# Patient Record
Sex: MALE | URBAN - METROPOLITAN AREA
[De-identification: names, ages, dates, MRNs, and addresses within clinical notes are randomized per-mention and may not be internally consistent; named-entity substitution may affect disease eponyms.]

---

## 2024-04-25 ENCOUNTER — INPATIENT (INPATIENT)
Facility: HOSPITAL | Age: 19
LOS: 2 days | Discharge: HOME | DRG: 392 | End: 2024-04-28
Attending: STUDENT IN AN ORGANIZED HEALTH CARE EDUCATION/TRAINING PROGRAM | Admitting: STUDENT IN AN ORGANIZED HEALTH CARE EDUCATION/TRAINING PROGRAM
Payer: COMMERCIAL

## 2024-04-25 VITALS
RESPIRATION RATE: 20 BRPM | TEMPERATURE: 98 F | HEIGHT: 69 IN | HEART RATE: 120 BPM | SYSTOLIC BLOOD PRESSURE: 138 MMHG | WEIGHT: 220.9 LBS | OXYGEN SATURATION: 99 % | DIASTOLIC BLOOD PRESSURE: 74 MMHG

## 2024-04-25 DIAGNOSIS — R10.9 UNSPECIFIED ABDOMINAL PAIN: ICD-10-CM

## 2024-04-25 LAB
ALBUMIN SERPL ELPH-MCNC: 4.2 G/DL — SIGNIFICANT CHANGE UP (ref 3.3–5)
ALP SERPL-CCNC: 67 U/L — SIGNIFICANT CHANGE UP (ref 40–120)
ALT FLD-CCNC: 17 U/L — SIGNIFICANT CHANGE UP (ref 10–45)
ANION GAP SERPL CALC-SCNC: 14 MMOL/L — SIGNIFICANT CHANGE UP (ref 5–17)
APPEARANCE UR: CLEAR — SIGNIFICANT CHANGE UP
AST SERPL-CCNC: 14 U/L — SIGNIFICANT CHANGE UP (ref 10–40)
BACTERIA # UR AUTO: NEGATIVE /HPF — SIGNIFICANT CHANGE UP
BASE EXCESS BLDV CALC-SCNC: 1 MMOL/L — SIGNIFICANT CHANGE UP (ref -2–3)
BASOPHILS # BLD AUTO: 0.06 K/UL — SIGNIFICANT CHANGE UP (ref 0–0.2)
BASOPHILS NFR BLD AUTO: 0.5 % — SIGNIFICANT CHANGE UP (ref 0–2)
BILIRUB SERPL-MCNC: 1 MG/DL — SIGNIFICANT CHANGE UP (ref 0.2–1.2)
BILIRUB UR-MCNC: NEGATIVE — SIGNIFICANT CHANGE UP
BUN SERPL-MCNC: 13 MG/DL — SIGNIFICANT CHANGE UP (ref 7–23)
CA-I SERPL-SCNC: 1.23 MMOL/L — SIGNIFICANT CHANGE UP (ref 1.15–1.33)
CALCIUM SERPL-MCNC: 9.9 MG/DL — SIGNIFICANT CHANGE UP (ref 8.4–10.5)
CAST: 0 /LPF — SIGNIFICANT CHANGE UP (ref 0–4)
CHLORIDE BLDV-SCNC: 101 MMOL/L — SIGNIFICANT CHANGE UP (ref 96–108)
CHLORIDE SERPL-SCNC: 100 MMOL/L — SIGNIFICANT CHANGE UP (ref 96–108)
CO2 BLDV-SCNC: 28 MMOL/L — HIGH (ref 22–26)
CO2 SERPL-SCNC: 23 MMOL/L — SIGNIFICANT CHANGE UP (ref 22–31)
COLOR SPEC: YELLOW — SIGNIFICANT CHANGE UP
CREAT SERPL-MCNC: 0.92 MG/DL — SIGNIFICANT CHANGE UP (ref 0.5–1.3)
DIFF PNL FLD: ABNORMAL
EGFR: 121 ML/MIN/1.73M2 — SIGNIFICANT CHANGE UP
EOSINOPHIL # BLD AUTO: 0.02 K/UL — SIGNIFICANT CHANGE UP (ref 0–0.5)
EOSINOPHIL NFR BLD AUTO: 0.2 % — SIGNIFICANT CHANGE UP (ref 0–6)
GAS PNL BLDV: 134 MMOL/L — LOW (ref 136–145)
GAS PNL BLDV: SIGNIFICANT CHANGE UP
GLUCOSE BLDV-MCNC: 90 MG/DL — SIGNIFICANT CHANGE UP (ref 70–99)
GLUCOSE SERPL-MCNC: 90 MG/DL — SIGNIFICANT CHANGE UP (ref 70–99)
GLUCOSE UR QL: NEGATIVE MG/DL — SIGNIFICANT CHANGE UP
HCO3 BLDV-SCNC: 27 MMOL/L — SIGNIFICANT CHANGE UP (ref 22–29)
HCT VFR BLD CALC: 40.8 % — SIGNIFICANT CHANGE UP (ref 39–50)
HCT VFR BLDA CALC: 41 % — SIGNIFICANT CHANGE UP (ref 39–51)
HGB BLD CALC-MCNC: 13.8 G/DL — SIGNIFICANT CHANGE UP (ref 12.6–17.4)
HGB BLD-MCNC: 13.4 G/DL — SIGNIFICANT CHANGE UP (ref 13–17)
IMM GRANULOCYTES NFR BLD AUTO: 0.5 % — SIGNIFICANT CHANGE UP (ref 0–0.9)
KETONES UR-MCNC: NEGATIVE MG/DL — SIGNIFICANT CHANGE UP
LACTATE BLDV-MCNC: 1 MMOL/L — SIGNIFICANT CHANGE UP (ref 0.5–2)
LEUKOCYTE ESTERASE UR-ACNC: NEGATIVE — SIGNIFICANT CHANGE UP
LIDOCAIN IGE QN: 23 U/L — SIGNIFICANT CHANGE UP (ref 7–60)
LYMPHOCYTES # BLD AUTO: 1.96 K/UL — SIGNIFICANT CHANGE UP (ref 1–3.3)
LYMPHOCYTES # BLD AUTO: 15 % — SIGNIFICANT CHANGE UP (ref 13–44)
MCHC RBC-ENTMCNC: 27 PG — SIGNIFICANT CHANGE UP (ref 27–34)
MCHC RBC-ENTMCNC: 32.8 GM/DL — SIGNIFICANT CHANGE UP (ref 32–36)
MCV RBC AUTO: 82.3 FL — SIGNIFICANT CHANGE UP (ref 80–100)
MONOCYTES # BLD AUTO: 0.92 K/UL — HIGH (ref 0–0.9)
MONOCYTES NFR BLD AUTO: 7 % — SIGNIFICANT CHANGE UP (ref 2–14)
NEUTROPHILS # BLD AUTO: 10.07 K/UL — HIGH (ref 1.8–7.4)
NEUTROPHILS NFR BLD AUTO: 76.8 % — SIGNIFICANT CHANGE UP (ref 43–77)
NITRITE UR-MCNC: NEGATIVE — SIGNIFICANT CHANGE UP
NRBC # BLD: 0 /100 WBCS — SIGNIFICANT CHANGE UP (ref 0–0)
PCO2 BLDV: 45 MMHG — SIGNIFICANT CHANGE UP (ref 42–55)
PH BLDV: 7.38 — SIGNIFICANT CHANGE UP (ref 7.32–7.43)
PH UR: 6 — SIGNIFICANT CHANGE UP (ref 5–8)
PLATELET # BLD AUTO: 314 K/UL — SIGNIFICANT CHANGE UP (ref 150–400)
PO2 BLDV: 19 MMHG — LOW (ref 25–45)
POTASSIUM BLDV-SCNC: 4.3 MMOL/L — SIGNIFICANT CHANGE UP (ref 3.5–5.1)
POTASSIUM SERPL-MCNC: 4.1 MMOL/L — SIGNIFICANT CHANGE UP (ref 3.5–5.3)
POTASSIUM SERPL-SCNC: 4.1 MMOL/L — SIGNIFICANT CHANGE UP (ref 3.5–5.3)
PROT SERPL-MCNC: 8.8 G/DL — HIGH (ref 6–8.3)
PROT UR-MCNC: NEGATIVE MG/DL — SIGNIFICANT CHANGE UP
RBC # BLD: 4.96 M/UL — SIGNIFICANT CHANGE UP (ref 4.2–5.8)
RBC # FLD: 12.5 % — SIGNIFICANT CHANGE UP (ref 10.3–14.5)
RBC CASTS # UR COMP ASSIST: 3 /HPF — SIGNIFICANT CHANGE UP (ref 0–4)
SAO2 % BLDV: 22.8 % — LOW (ref 67–88)
SODIUM SERPL-SCNC: 137 MMOL/L — SIGNIFICANT CHANGE UP (ref 135–145)
SP GR SPEC: 1.02 — SIGNIFICANT CHANGE UP (ref 1–1.03)
SQUAMOUS # UR AUTO: 0 /HPF — SIGNIFICANT CHANGE UP (ref 0–5)
UROBILINOGEN FLD QL: 4 MG/DL (ref 0.2–1)
WBC # BLD: 13.1 K/UL — HIGH (ref 3.8–10.5)
WBC # FLD AUTO: 13.1 K/UL — HIGH (ref 3.8–10.5)
WBC UR QL: 1 /HPF — SIGNIFICANT CHANGE UP (ref 0–5)

## 2024-04-25 PROCEDURE — 99285 EMERGENCY DEPT VISIT HI MDM: CPT

## 2024-04-25 PROCEDURE — 99222 1ST HOSP IP/OBS MODERATE 55: CPT | Mod: GC

## 2024-04-25 RX ORDER — SODIUM CHLORIDE 9 MG/ML
1000 INJECTION INTRAMUSCULAR; INTRAVENOUS; SUBCUTANEOUS ONCE
Refills: 0 | Status: COMPLETED | OUTPATIENT
Start: 2024-04-25 | End: 2024-04-25

## 2024-04-25 RX ORDER — CIPROFLOXACIN LACTATE 400MG/40ML
VIAL (ML) INTRAVENOUS
Refills: 0 | Status: DISCONTINUED | OUTPATIENT
Start: 2024-04-25 | End: 2024-04-27

## 2024-04-25 RX ORDER — ACETAMINOPHEN 500 MG
1000 TABLET ORAL ONCE
Refills: 0 | Status: COMPLETED | OUTPATIENT
Start: 2024-04-25 | End: 2024-04-25

## 2024-04-25 RX ORDER — CIPROFLOXACIN LACTATE 400MG/40ML
400 VIAL (ML) INTRAVENOUS ONCE
Refills: 0 | Status: COMPLETED | OUTPATIENT
Start: 2024-04-25 | End: 2024-04-25

## 2024-04-25 RX ORDER — CIPROFLOXACIN LACTATE 400MG/40ML
400 VIAL (ML) INTRAVENOUS EVERY 12 HOURS
Refills: 0 | Status: DISCONTINUED | OUTPATIENT
Start: 2024-04-26 | End: 2024-04-27

## 2024-04-25 RX ORDER — DEXTROSE MONOHYDRATE, SODIUM CHLORIDE, AND POTASSIUM CHLORIDE 50; .745; 4.5 G/1000ML; G/1000ML; G/1000ML
1000 INJECTION, SOLUTION INTRAVENOUS
Refills: 0 | Status: DISCONTINUED | OUTPATIENT
Start: 2024-04-25 | End: 2024-04-27

## 2024-04-25 RX ORDER — METRONIDAZOLE 500 MG
500 TABLET ORAL EVERY 8 HOURS
Refills: 0 | Status: DISCONTINUED | OUTPATIENT
Start: 2024-04-25 | End: 2024-04-27

## 2024-04-25 RX ADMIN — Medication 1000 MILLIGRAM(S): at 14:38

## 2024-04-25 RX ADMIN — SODIUM CHLORIDE 1000 MILLILITER(S): 9 INJECTION INTRAMUSCULAR; INTRAVENOUS; SUBCUTANEOUS at 14:38

## 2024-04-25 RX ADMIN — Medication 200 MILLIGRAM(S): at 15:53

## 2024-04-25 RX ADMIN — SODIUM CHLORIDE 1000 MILLILITER(S): 9 INJECTION INTRAMUSCULAR; INTRAVENOUS; SUBCUTANEOUS at 13:45

## 2024-04-25 RX ADMIN — DEXTROSE MONOHYDRATE, SODIUM CHLORIDE, AND POTASSIUM CHLORIDE 140 MILLILITER(S): 50; .745; 4.5 INJECTION, SOLUTION INTRAVENOUS at 17:19

## 2024-04-25 RX ADMIN — Medication 100 MILLIGRAM(S): at 21:20

## 2024-04-25 RX ADMIN — Medication 400 MILLIGRAM(S): at 13:48

## 2024-04-25 NOTE — ED CLERICAL - NS ED CLERK NOTE PRE-ARRIVAL INFORMATION; ADDITIONAL PRE-ARRIVAL INFORMATION
CC/Reason For referral: 2 weeks of symptoms. CT scan done today showing sigmoid diverticulitis with 5cm multiloculated abscess with fistula. Dr. Lombardi made aware.   Preferred Consultant(if applicable):  Who admits for you (if needed):  Do you have documents you would like to fax over?  Would you still like to speak to an ED attending? Yes

## 2024-04-25 NOTE — ED PROVIDER NOTE - OBJECTIVE STATEMENT
19-year-old male presented to emergency department due to abdominal pain for the past week.  Patient states the pain is in the pelvic area and has been having clear mucoid stools with it.  He states pain is worse when he has to go to the bathroom, with urinating and defecating.  Per the patient's primary care, he was given antibiotics for concern for UTI or STD, but this did not help.  Patient states he is also been feeling febrile as well but denies any cough, congestion, shortness of breath, chest pain.

## 2024-04-25 NOTE — H&P ADULT - HISTORY OF PRESENT ILLNESS
SURGERY CONSULT NOTE    Patient is a 19y old  Male who presents with a chief complaint of abdominal pain    HPI:  19M with no significant PMH developed low abdominal pain two weeks ago and thought to have UTI and was given 1 week antibiotics, with low abdominal pain persisting prompting outpatient CT which was concerning for diverticular abscess.  He reports he has had clear mucoid stools; pain worse with BM. Endorses subjective fever/chills. Works as EMS. Stable in the ED.      PAST MEDICAL & SURGICAL HISTORY:    [  ] No significant past history as reviewed with the patient and family    FAMILY HISTORY:    [  ] Family history not pertinent as reviewed with the patient and family    SOCIAL HISTORY:    MEDICATIONS  (STANDING):  ciprofloxacin   IVPB      dextrose 5% + sodium chloride 0.45% with potassium chloride 20 mEq/L 1000 milliLiter(s) (140 mL/Hr) IV Continuous <Continuous>  metroNIDAZOLE  IVPB 500 milliGRAM(s) IV Intermittent every 8 hours    MEDICATIONS  (PRN):    Allergies    No Known Allergies    Intolerances    PHYSICAL EXAM  General: No acute distress, resting comfortably in bed.  HEENT: Normocephalic atraumatic  Respiratory: Nonlabored respirations  Cardio: regular rate  Abdomen: soft, nondistended, nontender  Extremities: warm and well perfused      Vital Signs Last 24 Hrs  T(C): 38.1 (25 Apr 2024 13:31), Max: 38.1 (25 Apr 2024 13:31)  T(F): 100.5 (25 Apr 2024 13:31), Max: 100.5 (25 Apr 2024 13:31)  HR: 117 (25 Apr 2024 13:31) (117 - 120)  BP: 133/69 (25 Apr 2024 13:31) (133/69 - 138/74)  BP(mean): 88 (25 Apr 2024 13:31) (88 - 88)  RR: 18 (25 Apr 2024 13:31) (18 - 20)  SpO2: 100% (25 Apr 2024 13:31) (99% - 100%)    Parameters below as of 25 Apr 2024 13:31  Patient On (Oxygen Delivery Method): room air      Daily Height in cm: 175.26 (25 Apr 2024 13:01)    Daily                             13.4   13.10 )-----------( 314      ( 25 Apr 2024 13:41 )             40.8     04-25    137  |  100  |  13  ----------------------------<  90  4.1   |  23  |  0.92    Ca    9.9      25 Apr 2024 13:41    TPro  8.8<H>  /  Alb  4.2  /  TBili  1.0  /  DBili  x   /  AST  14  /  ALT  17  /  AlkPhos  67  04-25      Urinalysis Basic - ( 25 Apr 2024 13:41 )    Color: x / Appearance: x / SG: x / pH: x  Gluc: 90 mg/dL / Ketone: x  / Bili: x / Urobili: x   Blood: x / Protein: x / Nitrite: x   Leuk Esterase: x / RBC: x / WBC x   Sq Epi: x / Non Sq Epi: x / Bacteria: x

## 2024-04-25 NOTE — H&P ADULT - ATTENDING COMMENTS
Perforated diverticulitis with pelvic/peritoneal abscess  -IV abx  -NPO to clears  -dvt ppx  -IR evaluation  -serial abdominal exams

## 2024-04-25 NOTE — H&P ADULT - ASSESSMENT
19M w/ sigmoid diverticulitis complicated by 4 x 4.9 x3.3 cm multiloculated abscess.    Plan  - Admit to red team surgery  - no acute surgical intervention  - IR Consult for drainage  - NPO/IVF  - Cipro/flagyl  - Pain control  - Monitor bms  - Mechanical DVTp. Holding chemical pending IR eval    Patient discussed with Colorectal Surgery Fellow on behalf of Dr. Wilson  Surgery Team Red   [id number]

## 2024-04-25 NOTE — ED ADULT NURSE REASSESSMENT NOTE - COMFORT CARE
plan of care explained/side rails down/wait time explained
plan of care explained/side rails down/warm blanket provided

## 2024-04-25 NOTE — ED PROVIDER NOTE - ATTENDING CONTRIBUTION TO CARE
I, Bayron Lundy, have performed a history and physical exam on this patient, and discussed their management with the resident. I have fully participated in the care of this patient. I agree with the history, physical exam, and plan as documented by the resident

## 2024-04-25 NOTE — CONSULT NOTE ADULT - SUBJECTIVE AND OBJECTIVE BOX
Interventional Radiology    HPI: 18 y/o presenting w/ abdominal pain 2/2 perforated sigmoid diverticulitis. IR consulted for abscess drainage.    Allergies: No Known Allergies    Medications (Abx/Cardiac/Anticoagulation/Blood Products)  ciprofloxacin   IVPB: 200 mL/Hr IV Intermittent (04-25 @ 15:53)    Data:  175.3  100.2  T(C): 36.6  HR: 88  BP: 121/67  RR: 16  SpO2: 96%    -WBC 13.10 / HgB 13.4 / Hct 40.8 / Plt 314  -Na 137 / Cl 100 / BUN 13 / Glucose 90  -K 4.1 / CO2 23 / Cr 0.92  -ALT 17 / Alk Phos 67 / T.Bili 1.0      Radiology:   Outpatient CT A/P reviewed    Assessment/Plan:   18 y/o presenting w/ abdominal pain 2/2 perforated sigmoid diverticulitis. IR consulted for abscess drainage.    -- Imaging reviewed; collection is small and intraluminal in nature; not ammenable to drainage  -- Recommend conservative management with abx at this time.  -- IR will sign off at this time, please reconsult if this patients clinical status changes.     --  Tod Domingo, DO  PGY-2 Vascular and Interventional Radiology Resident   Available on [employer] Teams    - Non-emergent consults: Place IR consult order in [place]  - Emergent issues (pager): [facility] [phone]; [facility] [phone]; [phone]  - Scheduling questions: [facility] [phone]; [facility] [phone]  - Clinic/outpatient booking: [facility] [phone]; [facility] [phone] Interventional Radiology    HPI: 20 y/o presenting w/ abdominal pain 2/2 perforated sigmoid diverticulitis. IR consulted for abscess drainage.    Allergies: No Known Allergies    Medications (Abx/Cardiac/Anticoagulation/Blood Products)  ciprofloxacin   IVPB: 200 mL/Hr IV Intermittent (04-25 @ 15:53)    Data:  175.3  100.2  T(C): 36.6  HR: 88  BP: 121/67  RR: 16  SpO2: 96%    -WBC 13.10 / HgB 13.4 / Hct 40.8 / Plt 314  -Na 137 / Cl 100 / BUN 13 / Glucose 90  -K 4.1 / CO2 23 / Cr 0.92  -ALT 17 / Alk Phos 67 / T.Bili 1.0      Radiology:   Outpatient CT A/P reviewed    Assessment/Plan:   20 y/o presenting w/ abdominal pain 2/2 perforated sigmoid diverticulitis. IR consulted for abscess drainage.    -- Imaging reviewed; collection is small and intraluminal in nature; not amenable to drainage.  -- Recommend conservative management with abx at this time.  -- IR will sign off at this time, please reconsult if this patients clinical status changes.     --  Tod Domingo, DO  PGY-2 Vascular and Interventional Radiology Resident   Available on [employer] Teams    - Non-emergent consults: Place IR consult order in [place]  - Emergent issues (pager): [facility] [phone]; [facility] [phone]; [phone]  - Scheduling questions: [facility] [phone]; [facility] [phone]  - Clinic/outpatient booking: [facility] [phone]; [facility] [phone]

## 2024-04-25 NOTE — ED PROVIDER NOTE - CLINICAL SUMMARY MEDICAL DECISION MAKING FREE TEXT BOX
19-year-old male presented to emergency department due to abdominal pain for the past week.  Patient states the pain is in the pelvic area and has been having clear mucoid stools with it.  Patient was given treatments for STDs and UTIs by primary care doctor with no relief.  Patient also been having fevers in the emergency department.  On exam patient has suprapubic tenderness to palpation, no tenderness of the regions of the abdomen.  No swelling or tenderness of the testicles bilaterally.  CT scan performed outpatient showed diverticulitis.  Will order basic labs for abdominal pain.  Surgery will review CT scan and let us know what that this will require a surgical intervention.

## 2024-04-25 NOTE — H&P ADULT - NSHPLABSRESULTS_GEN_ALL_CORE
OUTPATIENT IMAGING UPLOADING  - Report says sigmoid diverticulitis complicated by 4 x 4.9 x3.3 cm multiloculated abscess. A portion of abscess abuts the rectum with indistinct wall concerning for fisutla.

## 2024-04-25 NOTE — ED ADULT NURSE REASSESSMENT NOTE - NS ED NURSE REASSESS COMMENT FT1
Pt listening to airpods, well appearing. Mother at bedside. Pt aware NPO and admitted waiting for bed.

## 2024-04-25 NOTE — ED ADULT NURSE NOTE - OBJECTIVE STATEMENT
Pt 19 year old male, A/O x4. Pt came in due to suprapubic pain and rectal pain. No PMH. As per pt, received recent CT that showed diverticulitis. Pt states he has been having on and of fevers. Also mentioned, urine was tested and blood was noted. Upon assessment, pt febrile 100.5F orally. LLQ tender to touch, soft, nondistended. Skin- hot, dry, intact. Denies chest pain, sob, chills, n/v/d, dysuria, ha, numbness/ tingling.

## 2024-04-26 LAB
ANION GAP SERPL CALC-SCNC: 12 MMOL/L — SIGNIFICANT CHANGE UP (ref 5–17)
BUN SERPL-MCNC: 9 MG/DL — SIGNIFICANT CHANGE UP (ref 7–23)
CALCIUM SERPL-MCNC: 9.2 MG/DL — SIGNIFICANT CHANGE UP (ref 8.4–10.5)
CHLORIDE SERPL-SCNC: 105 MMOL/L — SIGNIFICANT CHANGE UP (ref 96–108)
CO2 SERPL-SCNC: 21 MMOL/L — LOW (ref 22–31)
CREAT SERPL-MCNC: 0.77 MG/DL — SIGNIFICANT CHANGE UP (ref 0.5–1.3)
CULTURE RESULTS: NO GROWTH — SIGNIFICANT CHANGE UP
EGFR: 131 ML/MIN/1.73M2 — SIGNIFICANT CHANGE UP
GLUCOSE SERPL-MCNC: 101 MG/DL — HIGH (ref 70–99)
HCT VFR BLD CALC: 37.5 % — LOW (ref 39–50)
HGB BLD-MCNC: 11.9 G/DL — LOW (ref 13–17)
MAGNESIUM SERPL-MCNC: 2.1 MG/DL — SIGNIFICANT CHANGE UP (ref 1.6–2.6)
MCHC RBC-ENTMCNC: 26.7 PG — LOW (ref 27–34)
MCHC RBC-ENTMCNC: 31.7 GM/DL — LOW (ref 32–36)
MCV RBC AUTO: 84.1 FL — SIGNIFICANT CHANGE UP (ref 80–100)
NRBC # BLD: 0 /100 WBCS — SIGNIFICANT CHANGE UP (ref 0–0)
PHOSPHATE SERPL-MCNC: 3.5 MG/DL — SIGNIFICANT CHANGE UP (ref 2.5–4.5)
PLATELET # BLD AUTO: 267 K/UL — SIGNIFICANT CHANGE UP (ref 150–400)
POTASSIUM SERPL-MCNC: 4.1 MMOL/L — SIGNIFICANT CHANGE UP (ref 3.5–5.3)
POTASSIUM SERPL-SCNC: 4.1 MMOL/L — SIGNIFICANT CHANGE UP (ref 3.5–5.3)
RBC # BLD: 4.46 M/UL — SIGNIFICANT CHANGE UP (ref 4.2–5.8)
RBC # FLD: 12.5 % — SIGNIFICANT CHANGE UP (ref 10.3–14.5)
SODIUM SERPL-SCNC: 138 MMOL/L — SIGNIFICANT CHANGE UP (ref 135–145)
SPECIMEN SOURCE: SIGNIFICANT CHANGE UP
WBC # BLD: 9 K/UL — SIGNIFICANT CHANGE UP (ref 3.8–10.5)
WBC # FLD AUTO: 9 K/UL — SIGNIFICANT CHANGE UP (ref 3.8–10.5)

## 2024-04-26 PROCEDURE — 99222 1ST HOSP IP/OBS MODERATE 55: CPT

## 2024-04-26 RX ORDER — HEPARIN SODIUM 5000 [USP'U]/ML
5000 INJECTION INTRAVENOUS; SUBCUTANEOUS EVERY 8 HOURS
Refills: 0 | Status: DISCONTINUED | OUTPATIENT
Start: 2024-04-26 | End: 2024-04-28

## 2024-04-26 RX ADMIN — Medication 200 MILLIGRAM(S): at 05:25

## 2024-04-26 RX ADMIN — Medication 100 MILLIGRAM(S): at 14:11

## 2024-04-26 RX ADMIN — Medication 200 MILLIGRAM(S): at 18:33

## 2024-04-26 RX ADMIN — Medication 100 MILLIGRAM(S): at 21:04

## 2024-04-26 RX ADMIN — HEPARIN SODIUM 5000 UNIT(S): 5000 INJECTION INTRAVENOUS; SUBCUTANEOUS at 14:13

## 2024-04-26 RX ADMIN — Medication 100 MILLIGRAM(S): at 05:25

## 2024-04-26 RX ADMIN — HEPARIN SODIUM 5000 UNIT(S): 5000 INJECTION INTRAVENOUS; SUBCUTANEOUS at 21:04

## 2024-04-26 NOTE — PROGRESS NOTE ADULT - ATTENDING COMMENTS
Peforated diverticulitis  -IV abx   -dvt ppx  -clears to LRD  -ct scan as outpt to confirm resolution of abscess before DC of abx  -OOB Peforated diverticulitis  -IV abx   -dvt ppx  -clears to LRD  -ct scan as outpt to confirm resolution of abscess before DC of abx  -OOB  -Colonoscopy as outpt.  Will need to r/o IBD vs diverticulitis for colonic perforation.  Imaging reviewed.  May also be secondary to foreign body

## 2024-04-27 LAB
ANION GAP SERPL CALC-SCNC: 13 MMOL/L — SIGNIFICANT CHANGE UP (ref 5–17)
BUN SERPL-MCNC: 5 MG/DL — LOW (ref 7–23)
CALCIUM SERPL-MCNC: 9.6 MG/DL — SIGNIFICANT CHANGE UP (ref 8.4–10.5)
CHLORIDE SERPL-SCNC: 105 MMOL/L — SIGNIFICANT CHANGE UP (ref 96–108)
CO2 SERPL-SCNC: 22 MMOL/L — SIGNIFICANT CHANGE UP (ref 22–31)
CREAT SERPL-MCNC: 0.7 MG/DL — SIGNIFICANT CHANGE UP (ref 0.5–1.3)
EGFR: 134 ML/MIN/1.73M2 — SIGNIFICANT CHANGE UP
GLUCOSE SERPL-MCNC: 99 MG/DL — SIGNIFICANT CHANGE UP (ref 70–99)
HCT VFR BLD CALC: 38.2 % — LOW (ref 39–50)
HGB BLD-MCNC: 12.3 G/DL — LOW (ref 13–17)
MAGNESIUM SERPL-MCNC: 2.5 MG/DL — SIGNIFICANT CHANGE UP (ref 1.6–2.6)
MCHC RBC-ENTMCNC: 27 PG — SIGNIFICANT CHANGE UP (ref 27–34)
MCHC RBC-ENTMCNC: 32.2 GM/DL — SIGNIFICANT CHANGE UP (ref 32–36)
MCV RBC AUTO: 83.8 FL — SIGNIFICANT CHANGE UP (ref 80–100)
NRBC # BLD: 0 /100 WBCS — SIGNIFICANT CHANGE UP (ref 0–0)
PHOSPHATE SERPL-MCNC: 3.8 MG/DL — SIGNIFICANT CHANGE UP (ref 2.5–4.5)
PLATELET # BLD AUTO: 268 K/UL — SIGNIFICANT CHANGE UP (ref 150–400)
POTASSIUM SERPL-MCNC: 4.1 MMOL/L — SIGNIFICANT CHANGE UP (ref 3.5–5.3)
POTASSIUM SERPL-SCNC: 4.1 MMOL/L — SIGNIFICANT CHANGE UP (ref 3.5–5.3)
RBC # BLD: 4.56 M/UL — SIGNIFICANT CHANGE UP (ref 4.2–5.8)
RBC # FLD: 12.4 % — SIGNIFICANT CHANGE UP (ref 10.3–14.5)
SODIUM SERPL-SCNC: 140 MMOL/L — SIGNIFICANT CHANGE UP (ref 135–145)
WBC # BLD: 4.71 K/UL — SIGNIFICANT CHANGE UP (ref 3.8–10.5)
WBC # FLD AUTO: 4.71 K/UL — SIGNIFICANT CHANGE UP (ref 3.8–10.5)

## 2024-04-27 PROCEDURE — 99231 SBSQ HOSP IP/OBS SF/LOW 25: CPT

## 2024-04-27 RX ORDER — METRONIDAZOLE 500 MG
500 TABLET ORAL EVERY 8 HOURS
Refills: 0 | Status: DISCONTINUED | OUTPATIENT
Start: 2024-04-27 | End: 2024-04-28

## 2024-04-27 RX ORDER — CIPROFLOXACIN LACTATE 400MG/40ML
500 VIAL (ML) INTRAVENOUS EVERY 12 HOURS
Refills: 0 | Status: DISCONTINUED | OUTPATIENT
Start: 2024-04-27 | End: 2024-04-28

## 2024-04-27 RX ADMIN — Medication 500 MILLIGRAM(S): at 21:02

## 2024-04-27 RX ADMIN — Medication 100 MILLIGRAM(S): at 13:02

## 2024-04-27 RX ADMIN — Medication 100 MILLIGRAM(S): at 05:26

## 2024-04-27 RX ADMIN — Medication 200 MILLIGRAM(S): at 17:15

## 2024-04-27 RX ADMIN — HEPARIN SODIUM 5000 UNIT(S): 5000 INJECTION INTRAVENOUS; SUBCUTANEOUS at 05:26

## 2024-04-27 RX ADMIN — Medication 200 MILLIGRAM(S): at 05:27

## 2024-04-27 RX ADMIN — HEPARIN SODIUM 5000 UNIT(S): 5000 INJECTION INTRAVENOUS; SUBCUTANEOUS at 13:02

## 2024-04-27 RX ADMIN — HEPARIN SODIUM 5000 UNIT(S): 5000 INJECTION INTRAVENOUS; SUBCUTANEOUS at 21:02

## 2024-04-27 NOTE — PROGRESS NOTE ADULT - ASSESSMENT
19M w/ sigmoid diverticulitis complicated by 4 x 4.9 x3.3 cm multiloculated abscess.    Plan    - no acute surgical intervention  - f/u IR Consult for drainage  - NPO/IVF  - Cipro/flagyl  - Pain control    Surgery Team Red   [id number]  
19M w/ sigmoid diverticulitis complicated by 4 x 4.9 x3.3 cm multiloculated abscess.    Plan  - no acute surgical intervention  - LRD/IVL  - Cipro/flagyl eventual transition to PO prior to dc  - Pain control    Surgery Team Red   [id number]

## 2024-04-28 ENCOUNTER — TRANSCRIPTION ENCOUNTER (OUTPATIENT)
Age: 19
End: 2024-04-28

## 2024-04-28 ENCOUNTER — NON-APPOINTMENT (OUTPATIENT)
Age: 19
End: 2024-04-28

## 2024-04-28 VITALS
DIASTOLIC BLOOD PRESSURE: 76 MMHG | OXYGEN SATURATION: 96 % | RESPIRATION RATE: 18 BRPM | SYSTOLIC BLOOD PRESSURE: 105 MMHG | TEMPERATURE: 98 F | HEART RATE: 78 BPM

## 2024-04-28 LAB
ANION GAP SERPL CALC-SCNC: 16 MMOL/L — SIGNIFICANT CHANGE UP (ref 5–17)
BUN SERPL-MCNC: 8 MG/DL — SIGNIFICANT CHANGE UP (ref 7–23)
CALCIUM SERPL-MCNC: 10.2 MG/DL — SIGNIFICANT CHANGE UP (ref 8.4–10.5)
CHLORIDE SERPL-SCNC: 103 MMOL/L — SIGNIFICANT CHANGE UP (ref 96–108)
CO2 SERPL-SCNC: 23 MMOL/L — SIGNIFICANT CHANGE UP (ref 22–31)
CREAT SERPL-MCNC: 0.78 MG/DL — SIGNIFICANT CHANGE UP (ref 0.5–1.3)
EGFR: 130 ML/MIN/1.73M2 — SIGNIFICANT CHANGE UP
GLUCOSE SERPL-MCNC: 81 MG/DL — SIGNIFICANT CHANGE UP (ref 70–99)
HCT VFR BLD CALC: 42.4 % — SIGNIFICANT CHANGE UP (ref 39–50)
HGB BLD-MCNC: 13.7 G/DL — SIGNIFICANT CHANGE UP (ref 13–17)
MAGNESIUM SERPL-MCNC: 2.3 MG/DL — SIGNIFICANT CHANGE UP (ref 1.6–2.6)
MCHC RBC-ENTMCNC: 26.8 PG — LOW (ref 27–34)
MCHC RBC-ENTMCNC: 32.3 GM/DL — SIGNIFICANT CHANGE UP (ref 32–36)
MCV RBC AUTO: 83 FL — SIGNIFICANT CHANGE UP (ref 80–100)
NRBC # BLD: 0 /100 WBCS — SIGNIFICANT CHANGE UP (ref 0–0)
PHOSPHATE SERPL-MCNC: 4.3 MG/DL — SIGNIFICANT CHANGE UP (ref 2.5–4.5)
PLATELET # BLD AUTO: 332 K/UL — SIGNIFICANT CHANGE UP (ref 150–400)
POTASSIUM SERPL-MCNC: 4 MMOL/L — SIGNIFICANT CHANGE UP (ref 3.5–5.3)
POTASSIUM SERPL-SCNC: 4 MMOL/L — SIGNIFICANT CHANGE UP (ref 3.5–5.3)
RBC # BLD: 5.11 M/UL — SIGNIFICANT CHANGE UP (ref 4.2–5.8)
RBC # FLD: 12.3 % — SIGNIFICANT CHANGE UP (ref 10.3–14.5)
SODIUM SERPL-SCNC: 142 MMOL/L — SIGNIFICANT CHANGE UP (ref 135–145)
WBC # BLD: 5.27 K/UL — SIGNIFICANT CHANGE UP (ref 3.8–10.5)
WBC # FLD AUTO: 5.27 K/UL — SIGNIFICANT CHANGE UP (ref 3.8–10.5)

## 2024-04-28 PROCEDURE — 85027 COMPLETE CBC AUTOMATED: CPT

## 2024-04-28 PROCEDURE — 83735 ASSAY OF MAGNESIUM: CPT

## 2024-04-28 PROCEDURE — 82803 BLOOD GASES ANY COMBINATION: CPT

## 2024-04-28 PROCEDURE — 85025 COMPLETE CBC W/AUTO DIFF WBC: CPT

## 2024-04-28 PROCEDURE — 82435 ASSAY OF BLOOD CHLORIDE: CPT

## 2024-04-28 PROCEDURE — 81001 URINALYSIS AUTO W/SCOPE: CPT

## 2024-04-28 PROCEDURE — 99231 SBSQ HOSP IP/OBS SF/LOW 25: CPT

## 2024-04-28 PROCEDURE — 36415 COLL VENOUS BLD VENIPUNCTURE: CPT

## 2024-04-28 PROCEDURE — 83605 ASSAY OF LACTIC ACID: CPT

## 2024-04-28 PROCEDURE — 82330 ASSAY OF CALCIUM: CPT

## 2024-04-28 PROCEDURE — 87040 BLOOD CULTURE FOR BACTERIA: CPT

## 2024-04-28 PROCEDURE — 85018 HEMOGLOBIN: CPT

## 2024-04-28 PROCEDURE — 96365 THER/PROPH/DIAG IV INF INIT: CPT

## 2024-04-28 PROCEDURE — 80048 BASIC METABOLIC PNL TOTAL CA: CPT

## 2024-04-28 PROCEDURE — 83690 ASSAY OF LIPASE: CPT

## 2024-04-28 PROCEDURE — 82947 ASSAY GLUCOSE BLOOD QUANT: CPT

## 2024-04-28 PROCEDURE — 84132 ASSAY OF SERUM POTASSIUM: CPT

## 2024-04-28 PROCEDURE — 80053 COMPREHEN METABOLIC PANEL: CPT

## 2024-04-28 PROCEDURE — 87086 URINE CULTURE/COLONY COUNT: CPT

## 2024-04-28 PROCEDURE — 84295 ASSAY OF SERUM SODIUM: CPT

## 2024-04-28 PROCEDURE — 85014 HEMATOCRIT: CPT

## 2024-04-28 PROCEDURE — 99285 EMERGENCY DEPT VISIT HI MDM: CPT | Mod: 25

## 2024-04-28 PROCEDURE — 84100 ASSAY OF PHOSPHORUS: CPT

## 2024-04-28 RX ADMIN — Medication 500 MILLIGRAM(S): at 05:07

## 2024-04-28 RX ADMIN — HEPARIN SODIUM 5000 UNIT(S): 5000 INJECTION INTRAVENOUS; SUBCUTANEOUS at 05:07

## 2024-04-28 NOTE — DISCHARGE NOTE PROVIDER - CARE PROVIDER_API CALL
Bayron Wilson  Surgery  [address] [place] [address], [address]  [place] [address]  Phone: [phone]  Fax: [phone]  Follow Up Time:

## 2024-04-28 NOTE — DISCHARGE NOTE PROVIDER - HOSPITAL COURSE
19M with no significant PMH developed low abdominal pain two weeks ago and thought to have UTI and was given 1 week antibiotics, with low abdominal pain persisting prompting outpatient CT which was concerning for diverticular abscess.  He reports he has had clear mucoid stools; pain worse with BM. Endorses subjective fever/chills. Works as EMS. Stable in the ED.    The patient was started on IV cipro/ flagyl. Made npo and IR evaluated. Per IR no drainable collection. The patient was treated with antibiotics and diet was advanced over the course of 3 days. The cipro/ flagyl was transitioned to PO. At the time of discharge, the patient was hemodynamically stable, was tolerating PO diet, was voiding urine and passing stool, was ambulating, and was comfortable with adequate pain control. The patient was instructed to follow up with Dr. Wilson within 1-2 weeks after discharge from the hospital. The patient/family felt comfortable with discharge. The patient was discharged to home/rehab. The patient had no other issues.    The patient was discharged with 1 month of Augmentin.

## 2024-04-28 NOTE — DISCHARGE NOTE NURSING/CASE MANAGEMENT/SOCIAL WORK - PATIENT PORTAL LINK FT
You can access the FollowMyHealth Patient Portal offered by [facility] by registering at the following website: [email or web address]. By joining [facility]’s FollowMyHealth portal, you will also be able to view your health information using other applications (apps) compatible with our system.

## 2024-04-28 NOTE — DISCHARGE NOTE PROVIDER - NSDCFUADDINST_GEN_ALL_CORE_FT
Please follow up with Dr. Wilson within 1-2 weeks after discharge from the hospital. You may call to schedule an appointment.    Please the Antibioitic, Augmentin, as prescribed.

## 2024-04-28 NOTE — PROGRESS NOTE ADULT - SUBJECTIVE AND OBJECTIVE BOX
Surgery Progress Note    OVERNIGHT EVENTS: NAEO    SUBJECTIVE: Pt seen and examined at bedside. Patient comfortable and in no-apparent distress.     Vital Signs Last 24 Hrs  T(C): 36.9 (26 Apr 2024 04:51), Max: 38.1 (25 Apr 2024 13:31)  T(F): 98.5 (26 Apr 2024 04:51), Max: 100.5 (25 Apr 2024 13:31)  HR: 88 (26 Apr 2024 04:51) (87 - 120)  BP: 107/60 (26 Apr 2024 04:51) (107/60 - 138/74)  BP(mean): 84 (25 Apr 2024 17:54) (84 - 88)  RR: 18 (26 Apr 2024 04:51) (16 - 20)  SpO2: 98% (26 Apr 2024 04:51) (96% - 100%)    Parameters below as of 26 Apr 2024 04:51  Patient On (Oxygen Delivery Method): room air        PHYSICAL EXAM:  General Appearance: Appears well, NAD  Respiratory: No labored breathing  CV: Pulse regularly present  Abdomen: Soft, nontender      INs and OUTs:    04-25-24 @ 07:01  -  04-26-24 @ 05:59  --------------------------------------------------------  IN: 0 mL / OUT: 0 mL / NET: 0 mL        LABS:                        13.4   13.10 )-----------( 314      ( 25 Apr 2024 13:41 )             40.8     04-25    137  |  100  |  13  ----------------------------<  90  4.1   |  23  |  0.92    Ca    9.9      25 Apr 2024 13:41    TPro  8.8<H>  /  Alb  4.2  /  TBili  1.0  /  DBili  x   /  AST  14  /  ALT  17  /  AlkPhos  67  04-25      Urinalysis Basic - ( 25 Apr 2024 13:41 )    Color: x / Appearance: x / SG: x / pH: x  Gluc: 90 mg/dL / Ketone: x  / Bili: x / Urobili: x   Blood: x / Protein: x / Nitrite: x   Leuk Esterase: x / RBC: x / WBC x   Sq Epi: x / Non Sq Epi: x / Bacteria: x        
Surgery Progress Note:    OVERNIGHT EVENTS: NAEO    SUBJECTIVE: Pt seen and examined at bedside. Patient comfortable and in no-apparent distress. Pain is controlled.     MEDICATIONS  (STANDING):  ciprofloxacin   IVPB 400 milliGRAM(s) IV Intermittent every 12 hours  ciprofloxacin   IVPB      heparin   Injectable 5000 Unit(s) SubCutaneous every 8 hours  metroNIDAZOLE  IVPB 500 milliGRAM(s) IV Intermittent every 8 hours    MEDICATIONS  (PRN):    T(C): 36.6 (04-27-24 @ 09:10), Max: 37.2 (04-26-24 @ 20:53)  HR: 69 (04-27-24 @ 09:10) (65 - 85)  BP: 116/75 (04-27-24 @ 09:10) (105/64 - 135/87)  RR: 18 (04-27-24 @ 09:10) (18 - 18)  SpO2: 98% (04-27-24 @ 09:10) (97% - 98%)    04-26-24 @ 07:01  -  04-27-24 @ 07:00  --------------------------------------------------------  IN: 3900 mL / OUT: 1300 mL / NET: 2600 mL      LABS:                        12.3   4.71  )-----------( 268      ( 27 Apr 2024 07:19 )             38.2     04-27    140  |  105  |  5<L>  ----------------------------<  99  4.1   |  22  |  0.70    Ca    9.6      27 Apr 2024 07:19  Phos  3.8     04-27  Mg     2.5     04-27    TPro  8.8<H>  /  Alb  4.2  /  TBili  1.0  /  DBili  x   /  AST  14  /  ALT  17  /  AlkPhos  67  04-25      Urinalysis Basic - ( 27 Apr 2024 07:19 )    Color: x / Appearance: x / SG: x / pH: x  Gluc: 99 mg/dL / Ketone: x  / Bili: x / Urobili: x   Blood: x / Protein: x / Nitrite: x   Leuk Esterase: x / RBC: x / WBC x   Sq Epi: x / Non Sq Epi: x / Bacteria: x    PHYSICAL EXAM:  General Appearance: Appears well, NAD  Respiratory: No labored breathing  CV: Pulse regularly present  Abdomen: Soft, nontender  
no events, denies pain tolerating diet    abd soft nt              Objective:    MEDICATIONS  (STANDING):  ciprofloxacin     Tablet 500 milliGRAM(s) Oral every 12 hours  heparin   Injectable 5000 Unit(s) SubCutaneous every 8 hours  metroNIDAZOLE    Tablet 500 milliGRAM(s) Oral every 8 hours    MEDICATIONS  (PRN):      Vital Signs Last 24 Hrs  T(C): 36.6 (28 Apr 2024 05:21), Max: 36.7 (27 Apr 2024 13:06)  T(F): 97.9 (28 Apr 2024 05:21), Max: 98.1 (27 Apr 2024 21:08)  HR: 63 (28 Apr 2024 05:21) (63 - 78)  BP: 115/63 (28 Apr 2024 05:21) (115/63 - 123/78)  BP(mean): --  RR: 18 (28 Apr 2024 05:21) (18 - 18)  SpO2: 97% (28 Apr 2024 05:21) (97% - 98%)    Parameters below as of 28 Apr 2024 05:21  Patient On (Oxygen Delivery Method): room air        I&O's Detail    27 Apr 2024 07:01  -  28 Apr 2024 07:00  --------------------------------------------------------  IN:    IV PiggyBack: 100 mL    IV PiggyBack: 200 mL  Total IN: 300 mL    OUT:  Total OUT: 0 mL    Total NET: 300 mL          Daily     Daily     LABS:                        13.7   5.27  )-----------( 332      ( 28 Apr 2024 06:42 )             42.4     04-28    142  |  103  |  8   ----------------------------<  81  4.0   |  23  |  0.78    Ca    10.2      28 Apr 2024 06:41  Phos  4.3     04-28  Mg     2.3     04-28        Urinalysis Basic - ( 28 Apr 2024 06:41 )    Color: x / Appearance: x / SG: x / pH: x  Gluc: 81 mg/dL / Ketone: x  / Bili: x / Urobili: x   Blood: x / Protein: x / Nitrite: x   Leuk Esterase: x / RBC: x / WBC x   Sq Epi: x / Non Sq Epi: x / Bacteria: x        RADIOLOGY & ADDITIONAL STUDIES:

## 2024-04-28 NOTE — DISCHARGE NOTE PROVIDER - NSDCCPCAREPLAN_GEN_ALL_CORE_FT
PRINCIPAL DISCHARGE DIAGNOSIS  Diagnosis: Acute diverticulitis of intestine  Assessment and Plan of Treatment: cannot rule out IBD

## 2024-04-30 LAB
CULTURE RESULTS: SIGNIFICANT CHANGE UP
CULTURE RESULTS: SIGNIFICANT CHANGE UP
SPECIMEN SOURCE: SIGNIFICANT CHANGE UP
SPECIMEN SOURCE: SIGNIFICANT CHANGE UP

## 2024-05-07 ENCOUNTER — APPOINTMENT (OUTPATIENT)
Dept: COLORECTAL SURGERY | Facility: CLINIC | Age: 19
End: 2024-05-07
Payer: COMMERCIAL

## 2024-05-07 VITALS
WEIGHT: 215 LBS | HEART RATE: 73 BPM | OXYGEN SATURATION: 98 % | RESPIRATION RATE: 15 BRPM | BODY MASS INDEX: 33.74 KG/M2 | TEMPERATURE: 98.1 F | DIASTOLIC BLOOD PRESSURE: 84 MMHG | HEIGHT: 67 IN | SYSTOLIC BLOOD PRESSURE: 133 MMHG

## 2024-05-07 DIAGNOSIS — Z82.49 FAMILY HISTORY OF ISCHEMIC HEART DISEASE AND OTHER DISEASES OF THE CIRCULATORY SYSTEM: ICD-10-CM

## 2024-05-07 DIAGNOSIS — K57.32 DIVERTICULITIS OF LARGE INTESTINE W/OUT PERFORATION OR ABSCESS W/OUT BLEEDING: ICD-10-CM

## 2024-05-07 PROBLEM — Z00.00 ENCOUNTER FOR PREVENTIVE HEALTH EXAMINATION: Status: ACTIVE | Noted: 2024-05-07

## 2024-05-07 PROCEDURE — 99213 OFFICE O/P EST LOW 20 MIN: CPT

## 2024-05-07 RX ORDER — AMOXICILLIN AND CLAVULANATE POTASSIUM 875; 125 MG/1; 1/1
875-125 TABLET, FILM COATED ORAL
Refills: 0 | Status: ACTIVE | COMMUNITY

## 2024-05-07 NOTE — HISTORY OF PRESENT ILLNESS
[FreeTextEntry1] : 19-year-old male with recent hospital admission for presumed perforated diverticulitis with pelvic abscess. Patient has been on antibiotics and currently is progressing well. He is tolerating diet no fevers or chills no nausea or vomiting. Normal bowel movements otherwise without complaint no aggravating factors

## 2024-05-07 NOTE — ASSESSMENT
[FreeTextEntry1] : Diverticulitis with pelvic abscess -Patient to continue oral antibiotics -We'll obtain repeat CT scan to demonstrate resolution of abscess -Patient will require colonoscopy  to evaluate sigmoid colon. CT scan is somewhat atypical for diverticular disease and given the patient's age, we'll need to rule out other underlying causes for perforation -Patient agreed to this plan we'll obtain CT scan we'll discuss treatment going toward after reviewing imaging

## 2024-05-07 NOTE — PHYSICAL EXAM
[FreeTextEntry1] : Alert and oriented x3 Moves all extremities no edema Abdomen soft nontender No rashes

## 2024-05-20 ENCOUNTER — RESULT REVIEW (OUTPATIENT)
Age: 19
End: 2024-05-20

## 2024-05-20 ENCOUNTER — OUTPATIENT (OUTPATIENT)
Dept: OUTPATIENT SERVICES | Facility: HOSPITAL | Age: 19
LOS: 1 days | End: 2024-05-20
Payer: COMMERCIAL

## 2024-05-20 ENCOUNTER — APPOINTMENT (OUTPATIENT)
Dept: CT IMAGING | Facility: CLINIC | Age: 19
End: 2024-05-20
Payer: COMMERCIAL

## 2024-05-20 DIAGNOSIS — Z00.8 ENCOUNTER FOR OTHER GENERAL EXAMINATION: ICD-10-CM

## 2024-05-20 PROCEDURE — 74177 CT ABD & PELVIS W/CONTRAST: CPT | Mod: 26

## 2024-05-20 PROCEDURE — 74177 CT ABD & PELVIS W/CONTRAST: CPT

## 2024-06-11 NOTE — ED ADULT TRIAGE NOTE - AS PAIN REST

## 2024-09-05 ENCOUNTER — APPOINTMENT (OUTPATIENT)
Dept: COLORECTAL SURGERY | Facility: CLINIC | Age: 19
End: 2024-09-05

## 2024-09-05 DIAGNOSIS — K57.32 DIVERTICULITIS OF LARGE INTESTINE W/OUT PERFORATION OR ABSCESS W/OUT BLEEDING: ICD-10-CM

## 2024-09-05 PROCEDURE — 45330 DIAGNOSTIC SIGMOIDOSCOPY: CPT

## 2024-09-20 RX ORDER — AMOXICILLIN AND CLAVULANATE POTASSIUM 875; 125 MG/1; MG/1
875-125 TABLET, COATED ORAL
Qty: 28 | Refills: 2 | Status: ACTIVE | COMMUNITY
Start: 2024-09-20 | End: 1900-01-01

## 2024-09-24 ENCOUNTER — APPOINTMENT (OUTPATIENT)
Dept: COLORECTAL SURGERY | Facility: CLINIC | Age: 19
End: 2024-09-24
Payer: COMMERCIAL

## 2024-09-24 DIAGNOSIS — K57.32 DIVERTICULITIS OF LARGE INTESTINE W/OUT PERFORATION OR ABSCESS W/OUT BLEEDING: ICD-10-CM

## 2024-09-24 PROCEDURE — 99213 OFFICE O/P EST LOW 20 MIN: CPT

## 2024-09-24 NOTE — ASSESSMENT
[FreeTextEntry1] : Smoldering perforated diverticulitis -Once again discussed laparoscopic sigmoid colon resection for persistent diverticular disease. The patient has never fully Had resolution of his symptoms. -Patient wishes to proceed -Risks and benefits were once again reviewed -all questions answered -Patient is scheduled at his earliest convenience Bowel prep and oral antibiotics to be given

## 2024-09-24 NOTE — HISTORY OF PRESENT ILLNESS
[FreeTextEntry1] : 19-year-old male with recent bouts of perforated diverticulitis. Attempt was made at colonoscopy but unable to perform given active inflammation and tortuous colon. We discussed treatment options previously the patient was attempting to wait to the winter for treatment. However reports persistent discomfort and antibiotics were restarted. He now wishes to proceed with surgery. He currently reports significant improvement with antibiotics no fevers or chills no nausea or vomiting. No aggravating factors

## 2024-09-25 RX ORDER — NEOMYCIN SULFATE 500 MG/1
500 TABLET ORAL
Qty: 3 | Refills: 0 | Status: ACTIVE | COMMUNITY
Start: 2024-09-25 | End: 1900-01-01

## 2024-09-25 RX ORDER — METRONIDAZOLE 250 MG/1
250 TABLET ORAL
Qty: 3 | Refills: 0 | Status: ACTIVE | COMMUNITY
Start: 2024-09-25 | End: 1900-01-01

## 2024-09-26 ENCOUNTER — EMERGENCY (EMERGENCY)
Facility: HOSPITAL | Age: 19
LOS: 1 days | Discharge: HOME | End: 2024-09-26
Admitting: EMERGENCY MEDICINE
Payer: OTHER MISCELLANEOUS

## 2024-09-26 VITALS
RESPIRATION RATE: 16 BRPM | SYSTOLIC BLOOD PRESSURE: 127 MMHG | OXYGEN SATURATION: 98 % | HEART RATE: 80 BPM | DIASTOLIC BLOOD PRESSURE: 84 MMHG | TEMPERATURE: 98 F | WEIGHT: 209.44 LBS

## 2024-09-26 LAB
ALBUMIN SERPL ELPH-MCNC: 4.3 G/DL — SIGNIFICANT CHANGE UP (ref 3.3–5)
ALP SERPL-CCNC: 63 U/L — SIGNIFICANT CHANGE UP (ref 40–120)
ALT FLD-CCNC: 51 U/L — HIGH (ref 4–41)
ANION GAP SERPL CALC-SCNC: 11 MMOL/L — SIGNIFICANT CHANGE UP (ref 7–14)
AST SERPL-CCNC: 37 U/L — SIGNIFICANT CHANGE UP (ref 4–40)
BASOPHILS # BLD AUTO: 0.05 K/UL — SIGNIFICANT CHANGE UP (ref 0–0.2)
BASOPHILS NFR BLD AUTO: 0.7 % — SIGNIFICANT CHANGE UP (ref 0–2)
BILIRUB SERPL-MCNC: 0.3 MG/DL — SIGNIFICANT CHANGE UP (ref 0.2–1.2)
BUN SERPL-MCNC: 10 MG/DL — SIGNIFICANT CHANGE UP (ref 7–23)
CALCIUM SERPL-MCNC: 9.7 MG/DL — SIGNIFICANT CHANGE UP (ref 8.4–10.5)
CHLORIDE SERPL-SCNC: 105 MMOL/L — SIGNIFICANT CHANGE UP (ref 98–107)
CO2 SERPL-SCNC: 26 MMOL/L — SIGNIFICANT CHANGE UP (ref 22–31)
CREAT SERPL-MCNC: 0.78 MG/DL — SIGNIFICANT CHANGE UP (ref 0.5–1.3)
EGFR: 130 ML/MIN/1.73M2 — SIGNIFICANT CHANGE UP
EOSINOPHIL # BLD AUTO: 0.07 K/UL — SIGNIFICANT CHANGE UP (ref 0–0.5)
EOSINOPHIL NFR BLD AUTO: 1 % — SIGNIFICANT CHANGE UP (ref 0–6)
GLUCOSE SERPL-MCNC: 100 MG/DL — HIGH (ref 70–99)
HCT VFR BLD CALC: 41.1 % — SIGNIFICANT CHANGE UP (ref 39–50)
HGB BLD-MCNC: 14 G/DL — SIGNIFICANT CHANGE UP (ref 13–17)
HIV 1+2 AB+HIV1 P24 AG SERPL QL IA: SIGNIFICANT CHANGE UP
IANC: 3.99 K/UL — SIGNIFICANT CHANGE UP (ref 1.8–7.4)
IMM GRANULOCYTES NFR BLD AUTO: 0.3 % — SIGNIFICANT CHANGE UP (ref 0–0.9)
LYMPHOCYTES # BLD AUTO: 2.26 K/UL — SIGNIFICANT CHANGE UP (ref 1–3.3)
LYMPHOCYTES # BLD AUTO: 33.1 % — SIGNIFICANT CHANGE UP (ref 13–44)
MCHC RBC-ENTMCNC: 27.6 PG — SIGNIFICANT CHANGE UP (ref 27–34)
MCHC RBC-ENTMCNC: 34.1 GM/DL — SIGNIFICANT CHANGE UP (ref 32–36)
MCV RBC AUTO: 81.1 FL — SIGNIFICANT CHANGE UP (ref 80–100)
MONOCYTES # BLD AUTO: 0.44 K/UL — SIGNIFICANT CHANGE UP (ref 0–0.9)
MONOCYTES NFR BLD AUTO: 6.4 % — SIGNIFICANT CHANGE UP (ref 2–14)
NEUTROPHILS # BLD AUTO: 3.99 K/UL — SIGNIFICANT CHANGE UP (ref 1.8–7.4)
NEUTROPHILS NFR BLD AUTO: 58.5 % — SIGNIFICANT CHANGE UP (ref 43–77)
NRBC # BLD: 0 /100 WBCS — SIGNIFICANT CHANGE UP (ref 0–0)
NRBC # FLD: 0 K/UL — SIGNIFICANT CHANGE UP (ref 0–0)
PLATELET # BLD AUTO: 289 K/UL — SIGNIFICANT CHANGE UP (ref 150–400)
POTASSIUM SERPL-MCNC: 4.4 MMOL/L — SIGNIFICANT CHANGE UP (ref 3.5–5.3)
POTASSIUM SERPL-SCNC: 4.4 MMOL/L — SIGNIFICANT CHANGE UP (ref 3.5–5.3)
PROT SERPL-MCNC: 7.9 G/DL — SIGNIFICANT CHANGE UP (ref 6–8.3)
RBC # BLD: 5.07 M/UL — SIGNIFICANT CHANGE UP (ref 4.2–5.8)
RBC # FLD: 13 % — SIGNIFICANT CHANGE UP (ref 10.3–14.5)
SODIUM SERPL-SCNC: 142 MMOL/L — SIGNIFICANT CHANGE UP (ref 135–145)
WBC # BLD: 6.83 K/UL — SIGNIFICANT CHANGE UP (ref 3.8–10.5)
WBC # FLD AUTO: 6.83 K/UL — SIGNIFICANT CHANGE UP (ref 3.8–10.5)

## 2024-09-26 NOTE — ED PROVIDER NOTE - PATIENT PORTAL LINK FT
You can access the FollowMyHealth Patient Portal offered by [facility] by registering at the following website: [email or web address]. By joining [facility]’s FollowMyHealth portal, you will also be able to view your health information using other applications (apps) compatible with our system.
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
## 2024-09-26 NOTE — ED PROVIDER NOTE - NSFOLLOWUPINSTRUCTIONS_ED_ALL_ED_FT
Follow up with your PMD within 48-72 hours.  Worsening, continued or new concerning symptoms return to the emergency department.

## 2024-09-26 NOTE — ED PROVIDER NOTE - ENMT, MLM
Airway patent, Nasal mucosa clear. Mouth with normal mucosa. Throat has no vesicles, no oropharyngeal exudates and uvula is midline. Excoriation on right anterior neck. No active bleeding.

## 2024-09-26 NOTE — ED PROVIDER NOTE - IV ALTEPLASE ADMIN OUTSIDE HIDDEN
show

## 2024-09-26 NOTE — ED ADULT TRIAGE NOTE - AVIAN FLU SYMPTOMS
S:  Tony's mother (Luisa) calling with status update    B:  Tony has been to ER 3 times in the last 8 weeks for ear infections.    A:  Finished last abx 7-10 days ago  Patient states that his ear is hurting again today  Denies swelling  Denies changes in breathing  Denies fever  Denies rash  Only symptom is hurting ear  Patient is not with Luisa, patient is at     Luisa is requesting an appointment with Dr Reynolds when possible.    R:  ED precautions and s/sx of high fever, facial swelling, ear swelling, respiratory distress discussed and Luisa stated understanding.    Writer instructed Luisa to bring patient to  for evaluation as there were no appointments available today.    Luisa will continue to monitor patient and will bring patient to urgent care if necessary tonight or voer the weekend.    Patient was scheduled for [facility]On 7/22/19 appointment for follow up to discuss these recurrent ear infections and possible referral.    No further action necessary.  Encounter closed.    Jef Centeno RN     No
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
## 2024-09-26 NOTE — ED PROVIDER NOTE - NSICDXNOPASTSURGICALHX_GEN_ALL_ED
Reason For Visit  NOE MILLER is an established patient here today for a chief complaint of reddish left eye started yesterday.   :  services not used.   A chaperone is not applicable. She is unaccompanied.        Quality    Adult Wellness CI height documented, discussion of regular exercise, exercising regularly, no printed information given for activities, discussion of nutritional quality of diet, no patient education given about proper diet, not using alcohol, colonoscopy performed: 12/03/2013, no tobacco use, mammogram performed: 06/25/2018, does not have feelings of hopelessness (PHQ-2), no Anhedonia (PHQ-2), not referred to local mental health center, not taking medication for depression, monitoring patient, preventive medicine therapy for influenza, preventive medicine therapy for pneumococcal, has not fallen within the last 12 months, able to walk and not taking aspirin.   Diabetes CI height documented, recent medical exam by an Ophthalmologist: 03/16/2018, no tobacco use, does not have feelings of hopelessness (PHQ-2), no anhedonia (PHQ-2), not referred to local mental health center, not taking medication for depression, monitoring patient, a foot inspection performed, right foot was examined - Pedal Pulse, left foot was examined - Pedal Pulse, Monofilament Wire Test of the right foot was performed, Monofilament Wire Test of the left foot was performed, preventative medicine test results documented/reviewed for Hemoglobin A1c (06/08/2018), preventive medicine results documented/reviewed for Microalbuminuria (03/29/2017) and ACE inhibitor therapy prescribed.      History of Present Illness  Patient is 78 years old female who is here today for urgent appointment with complaints of red discoloration of the conjunctivae of the left eye.  Patient reports no discharge  No change in vision, no pain of the eye.She also states that eye feels itchy  She was here 4 days ago with complaints of  cold, she states that her cough has resolved completely.      Review of Systems    Const: no fever.   Eyes: red eyes and itching of the eyes, but no eye pain, no blurred vision, no change in vision and no loss of vision.       Allergies  Aspirin TABS    Current Meds   1. Accu-Chek Compact Plus In Vitro Strip; TEST BS 1X/day;   Therapy: 02Apr2015 to (Evaluate:24Usl2919)  Requested for: 01Yro3507; Last   Rx:[id number] Ordered   2. Atorvastatin Calcium 10 MG Oral Tablet; TAKE 1 TABLET BY MOUTH EVERY DAY;   Therapy: 47Afw8324 to (Evaluate:09Mir0510)  Requested for: 87Dkh4300; Last   Rx:[id number] Ordered   3. Clobetasol Propionate 0.05 % External Lotion; APPLY AND GENTLY MASSAGE TO THE   AFFECTED AREA TWICE DAILY TO RASHES;   Therapy: 06Oct2017 to (Evaluate:34Twu6040)  Requested for: 44Xso6826; Last   Rx:[id number] Ordered   4. CloNIDine HCl - 0.1 MG Oral Tablet; TAKE 1 TABLET DAILY;   Therapy: 33Qyn1573 to (Evaluate:00Qgk4009)  Requested for: 30Usf5326; Last   Rx:[id number] Ordered   5. Clopidogrel Bisulfate 75 MG Oral Tablet; TAKE 1 TABLET BY MOUTH DAILY;   Therapy: 89Mgu9839 to (Evaluate:95Zwp5490)  Requested for: 83Jnu2001; Last   Rx:[id number] Ordered   6. Colace 100 MG Oral Capsule; TAKE 1 CAPSULE EVERY DAY AFTER DINNER AS   NEEDED;   Therapy: 35Qwb1264 to (Evaluate:60Llk9220)  Requested for: 62Qex9691; Last   Rx:[id number] Ordered   7. Enalapril Maleate 20 MG Oral Tablet; TAKE ONE TABLET BY MOUTH TWO TIMES A DAY;   Therapy: 61Wmm4140 to (Evaluate:09Mar2019)  Requested for: 10Oct2018; Last   Rx:[id number] Ordered   8. GlipiZIDE ER 10 MG Oral Tablet Extended Release 24 Hour; TAKE 1 TABLET BY MOUTH   EVERY 12 HOURS;   Therapy: 15Gkw8625 to (Evaluate:37Ktk5634)  Requested for: 29Oct2018; Last   Rx:[id number] Ordered   9. Multivitamins Oral Capsule; TAKE 1 CAPSULE DAILY;   Therapy: 26Mar2010 to  Requested for: 01Apr2016 Recorded   10. Omeprazole 20 MG Oral Capsule Delayed Release; TAKE 1 CAPSULE DAILY;    Therapy: 01Apr2016  to (Evaluate:97Qvp9591); Last Rx:[id number] Ordered   11. Pioglitazone HCl - 15 MG Oral Tablet; TAKE 1 TABLET BY MOUTH DAILY;    Therapy: 01Feb2018 to (Evaluate:44Fwh8976)  Requested for: 35Mai4056; Last    Rx:[id number] Ordered   12. Simvastatin 40 MG Oral Tablet; TAKE 1 TABLET BY MOUTH DAILY;    Therapy: 88Otu0318 to (Evaluate:36Sss7381)  Requested for: 41Khf5209; Last    Rx:[id number] Ordered   13. Ventolin  (90 Base) MCG/ACT Inhalation Aerosol Solution; 2 PUFFS EVERY 4    HOURS AS NEEDED FOR SHORTNESS OF BREATH;    Therapy: 26Oct2018 to (Last Rx:26Oct2018)  Requested for: 26Oct2018 Ordered    Active Problems  Abnormal EKG (R94.31)   · Last Impression: 09 Aug 2017  As above.  RIYA CHARLES (Cardiology)  Abnormal nuclear stress test (R94.39)   · Last Impression: 09 Aug 2017  He has been nearly a year and a half since her abnormal      stress test. She has been free of any significant symptoms. I therefore would like to      simply repeat the stress test at this point. If there is still moderate to high risk then we      will proceed with coronary angiography. Her lack of symptoms leads me to reconsider      the plan to go directly to coronary angiography.  RIYA CHARLES (Cardiology)  Cataract (H26.9)  CKD (chronic kidney disease), stage III (N18.3)  Contusion (T14.8XXA)  Cough (R05)  Dense breast tissue on mammogram (R92.2)  Diabetes mellitus (E11.9)   · Last Impression: 09 Aug 2017  Per PCP.  RIYA CHARLES (Cardiology)  Dyspnea on exertion (R06.09)   · Last Impression: 09 Aug 2017  She was found to have ischemia in the LAD and/or RCA      territory on a stress test last year. Since it has been a significant length of time since the      stress test and she has no symptoms, would proceed with repeat stress test at this time      to determine ongoing ischemia burden.  RIYA CHARLES (Cardiology)  Encounter for preventive health examination (Z00.00)  Foreign travel (Z78.9)  Heartburn (R12)  Hyperlipidemia  (E78.5)   · 3/29/17  HDL 56  LDL 96      7/6/17  HDL 57  LDL 64   · Last Impression: 09 Aug 2017  Continue statin  RIYA CHARLES (Cardiology)  Hyperlipidemia associated with type 2 diabetes mellitus (E11.69,E78.5)  Hypertension (I10)   · Last Impression: 09 Aug 2017  Stable. Continue current medications.  RIYA CHARLES      (Cardiology)  Hypertension associated with diabetes (E11.59,I10)  Liver cyst (K76.89)  Localized, primary osteoarthritis of hand (M19.049)  Low back pain (M54.5)  Need for immunization against influenza (Z23)  Need for pneumococcal vaccination (Z23)  NUD (nonulcer dyspepsia) (K30)  Osteoarthritis, localized, knee (M17.10)  Osteopenia (M85.80)  Pain in extremity (M79.609)  Pancreatic cyst (K86.2)  Pre-operative cardiovascular examination (Z01.810)  Skin rash (R21)  Status post fall (W19.XXXA)  Travel advice encounter (Z71.89)  Type 2 diabetes with nephropathy (E11.21)  URTI (acute upper respiratory infection) (J06.9)    Past Medical History  Denied: History of Asthma  Denied: History of Atrial Fibrillation  Denied: History of Breast Cancer  Denied: History of Colon Cancer  Denied: History of Coronary Artery Disease  Denied: History of Depression  Encounter for preventive health examination (Z00.00)  History of hypertension (Z86.79)  History of Impaired fasting glucose (R73.01)   · DM  Need for immunization against influenza (Z23)  Need for pneumococcal vaccination (Z23)    Surgical History  History of Appendectomy  History of Cholecystectomy  History of Colonoscopy (Fiberoptic)    Family History  Family History   Family history of hypertension (Z82.49)  Family history of type 2 diabetes mellitus (Z83.3)  Family history of Ischemic Stroke    Social History  Denied: History of Alcohol Use (History)  Denied: History of Current Smoker  Drug Use  Never smoker    Vitals  Signs   Recorded: 31Oct2018 12:06PM   Weight: 105 lb   BMI Calculated: 23.54  BSA Calculated: 1.35  Systolic:  140, LUE, Sitting  Diastolic: 62, LUE, Sitting  Temperature: 97.1 F, Temporal  Heart Rate: 81  Respiration: 18  O2 Saturation: 98    Physical Exam  Constitutional: alert, in no acute distress and current vital signs reviewed.   Eyes: no eyelid swelling and no ptosis .  (Examination showed subconjunctival hemorrhage medialAspect of the left eye). The left conjunctiva showed subconjunctival hemorrhage. pupils equal, round and reactive to light and accommodation and extraocular movements were intact.   Pulmonary: no respiratory distress, normal respiratory rate and effort and no accessory muscle use. breath sounds clear to auscultation bilaterally.   Cardiovascular: normal rate, no murmurs were heard, regular rhythm, normal S1 and normal S2. edema was not present in the lower extremities.      Immunizations  FLU (SPLT PF) --- Series1: 01-Mar-2011   Influenza --- Series1: 16-Feb-2012; Series2: 28-Feb-2013; Series3: 19-Sep-2013; Series4:  13-Nov-2014; Series5: 04-Feb-2016; Series6: 06-Nov-2017   PCV --- Series1: 19-Sep-2017   PPSV --- Series1: 01-Mar-2006; Series2: 25-Apr-2013   Td/DT --- Series1: 08-Feb-2014     Assessment  Subconjunctival hemorrhage of left eye (H11.32)    Plan  Plans: I explained to the patient that she has a broken vessel in the eye which recall subconjunctival hemorrhoids  It is a benign problem, it will resolve on its own  No need for any prescribed drops or any other further action  Recommended not to rub her eyes  May use warm compresses or use eyedrops for dry eyes  Flu shot was given today as well  Patient  verbalized understanding and agrees with the plan        Signatures   Electronically signed by : Robyn Robbins CMA; Oct 31 2018 11:56AM CST    Electronically signed by : CHRISTIAN MARTINEZ MD; Oct 31 2018 12:28PM CST    Electronically signed by : CHRISTIAN MARTINEZ MD; Nov 12 2018 11:06AM CST     <-- Click to add NO significant Past Surgical History

## 2024-09-27 ENCOUNTER — OUTPATIENT (OUTPATIENT)
Dept: OUTPATIENT SERVICES | Facility: HOSPITAL | Age: 19
LOS: 1 days | End: 2024-09-27

## 2024-09-27 VITALS
HEIGHT: 69 IN | HEART RATE: 74 BPM | WEIGHT: 214.95 LBS | SYSTOLIC BLOOD PRESSURE: 117 MMHG | OXYGEN SATURATION: 98 % | RESPIRATION RATE: 16 BRPM | TEMPERATURE: 98 F | DIASTOLIC BLOOD PRESSURE: 81 MMHG

## 2024-09-27 DIAGNOSIS — K57.92 DIVERTICULITIS OF INTESTINE, PART UNSPECIFIED, WITHOUT PERFORATION OR ABSCESS WITHOUT BLEEDING: ICD-10-CM

## 2024-09-27 DIAGNOSIS — K57.32 DIVERTICULITIS OF LARGE INTESTINE WITHOUT PERFORATION OR ABSCESS WITHOUT BLEEDING: ICD-10-CM

## 2024-09-27 DIAGNOSIS — Z98.890 OTHER SPECIFIED POSTPROCEDURAL STATES: Chronic | ICD-10-CM

## 2024-09-27 LAB
A1C WITH ESTIMATED AVERAGE GLUCOSE RESULT: 5.2 % — SIGNIFICANT CHANGE UP (ref 4–5.6)
BLD GP AB SCN SERPL QL: NEGATIVE — SIGNIFICANT CHANGE UP
ESTIMATED AVERAGE GLUCOSE: 103 — SIGNIFICANT CHANGE UP
HAV IGM SER-ACNC: SIGNIFICANT CHANGE UP
HBV CORE IGM SER-ACNC: SIGNIFICANT CHANGE UP
HBV SURFACE AG SER-ACNC: SIGNIFICANT CHANGE UP
HCV AB S/CO SERPL IA: 0.1 S/CO — SIGNIFICANT CHANGE UP (ref 0–0.99)
HCV AB SERPL-IMP: SIGNIFICANT CHANGE UP
RH IG SCN BLD-IMP: POSITIVE — SIGNIFICANT CHANGE UP
RH IG SCN BLD-IMP: POSITIVE — SIGNIFICANT CHANGE UP

## 2024-09-27 NOTE — H&P PST ADULT - ITE SK HX ROS MEA POS PC
had an ED visit yesterday at [facility] for an eval of a scratch and body fluid exposure by patient. Labs WNLs, no further treatment

## 2024-09-27 NOTE — H&P PST ADULT - NSANTHOSAYNRD_GEN_A_CORE
No. MAE screening performed.  STOP BANG Legend: 0-2 = LOW Risk; 3-4 = INTERMEDIATE Risk; 5-8 = HIGH Risk

## 2024-09-27 NOTE — H&P PST ADULT - PROBLEM SELECTOR PLAN 1
Patient tentatively scheduled for laparoscopic lower anterior resection for 10/4/24. Pre-op instructions provided. Pt given verbal and written instructions with teach back on chlorhexidine shampoo and pepcid. Pt verbalized understanding with return demonstration.     Labs reviewed 9/26/24 in HIE (CBC CMP) WNL    PST CBC T&S ABO A1C done

## 2024-09-27 NOTE — H&P PST ADULT - HISTORY OF PRESENT ILLNESS
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 Patient with h/o hospital admission for presumed perforated diverticulitis with pelvic abscess. Pt with recent bouts of perforated diverticulitis. Attempt was made at colonoscopy but unable to perform given active inflammation and tortuous colon. Pt is scheduled for a laparoscopic lower anterior resection.

## 2024-09-27 NOTE — H&P PST ADULT - ANESTHESIA, PREVIOUS REACTION, PROFILE
Render Note In Bullet Format When Appropriate: No Consent: The patient's consent was obtained including but not limited to risks of crusting, scabbing, blistering, scarring, darker or lighter pigmentary change, recurrence, incomplete removal and infection. Detail Level: Detailed Duration Of Freeze Thaw-Cycle (Seconds): 10-15 Number Of Freeze-Thaw Cycles: 3 freeze-thaw cycles Post-Care Instructions: I reviewed with the patient in detail post-care instructions. Patient is to wear sunprotection, and avoid picking at any of the treated lesions. Pt may apply Vaseline to crusted or scabbing areas. Spray Paint Text: The liquid nitrogen was applied to the skin utilizing a spray paint frosting technique. Show Topical Anesthesia Variable?: Yes Medical Necessity Clause: This procedure was medically necessary because the lesions that were treated were: Pared With?: razor blade Medical Necessity Information: It is in your best interest to select a reason for this procedure from the list below. All of these items fulfill various CMS LCD requirements except the new and changing color options. none

## 2024-10-04 ENCOUNTER — RESULT REVIEW (OUTPATIENT)
Age: 19
End: 2024-10-04

## 2024-10-04 ENCOUNTER — APPOINTMENT (OUTPATIENT)
Dept: COLORECTAL SURGERY | Facility: HOSPITAL | Age: 19
End: 2024-10-04

## 2024-10-04 ENCOUNTER — INPATIENT (INPATIENT)
Facility: HOSPITAL | Age: 19
LOS: 2 days | Discharge: HOME | End: 2024-10-07
Attending: STUDENT IN AN ORGANIZED HEALTH CARE EDUCATION/TRAINING PROGRAM | Admitting: STUDENT IN AN ORGANIZED HEALTH CARE EDUCATION/TRAINING PROGRAM
Payer: COMMERCIAL

## 2024-10-04 VITALS
SYSTOLIC BLOOD PRESSURE: 125 MMHG | HEIGHT: 69 IN | DIASTOLIC BLOOD PRESSURE: 76 MMHG | OXYGEN SATURATION: 98 % | RESPIRATION RATE: 16 BRPM | TEMPERATURE: 98 F | WEIGHT: 214.95 LBS | HEART RATE: 83 BPM

## 2024-10-04 DIAGNOSIS — K57.32 DIVERTICULITIS OF LARGE INTESTINE WITHOUT PERFORATION OR ABSCESS WITHOUT BLEEDING: ICD-10-CM

## 2024-10-04 DIAGNOSIS — Z98.890 OTHER SPECIFIED POSTPROCEDURAL STATES: Chronic | ICD-10-CM

## 2024-10-04 PROCEDURE — 15777 ACELLULAR DERM MATRIX IMPLT: CPT

## 2024-10-04 PROCEDURE — 44213 LAP MOBIL SPLENIC FL ADD-ON: CPT

## 2024-10-04 PROCEDURE — 44207 L COLECTOMY/COLOPROCTOSTOMY: CPT

## 2024-10-04 PROCEDURE — 45330 DIAGNOSTIC SIGMOIDOSCOPY: CPT

## 2024-10-04 PROCEDURE — 49020 DRAINAGE ABDOM ABSCESS OPEN: CPT

## 2024-10-04 PROCEDURE — 50715 RELEASE OF URETER: CPT | Mod: LT,59

## 2024-10-04 DEVICE — LIGATING CLIPS WECK HORIZON LARGE (ORANGE) 24: Type: IMPLANTABLE DEVICE | Status: FUNCTIONAL

## 2024-10-04 DEVICE — STAPLER COVIDIEN CIRCULAR TRI-STAPLE 28MM BLACK MEDIUM/THICK XL: Type: IMPLANTABLE DEVICE | Status: FUNCTIONAL

## 2024-10-04 DEVICE — STAPLER COVIDIEN GIA 80-3.0MM PURPLE: Type: IMPLANTABLE DEVICE | Status: FUNCTIONAL

## 2024-10-04 DEVICE — STAPLER COVIDIEN TA 45 BLUE: Type: IMPLANTABLE DEVICE | Status: FUNCTIONAL

## 2024-10-04 DEVICE — STAPLER COVIDIEN PURSTRING 65MM: Type: IMPLANTABLE DEVICE | Status: FUNCTIONAL

## 2024-10-04 DEVICE — LIGATING CLIPS WECK HORIZON MEDIUM (BLUE) 24: Type: IMPLANTABLE DEVICE | Status: FUNCTIONAL

## 2024-10-04 RX ORDER — ONDANSETRON HCL/PF 4 MG/2 ML
4 VIAL (ML) INJECTION ONCE
Refills: 0 | Status: DISCONTINUED | OUTPATIENT
Start: 2024-10-04 | End: 2024-10-04

## 2024-10-04 RX ORDER — FENTANYL CITRATE-0.9 % NACL/PF 300MCG/30
25 PATIENT CONTROLLED ANALGESIA VIAL INJECTION
Refills: 0 | Status: DISCONTINUED | OUTPATIENT
Start: 2024-10-04 | End: 2024-10-04

## 2024-10-04 RX ORDER — OXYCODONE HYDROCHLORIDE 30 MG/1
2.5 TABLET, FILM COATED, EXTENDED RELEASE ORAL EVERY 4 HOURS
Refills: 0 | Status: DISCONTINUED | OUTPATIENT
Start: 2024-10-04 | End: 2024-10-07

## 2024-10-04 RX ORDER — OXYCODONE HYDROCHLORIDE 30 MG/1
10 TABLET, FILM COATED, EXTENDED RELEASE ORAL ONCE
Refills: 0 | Status: DISCONTINUED | OUTPATIENT
Start: 2024-10-04 | End: 2024-10-04

## 2024-10-04 RX ORDER — CELECOXIB 200 MG/1
400 CAPSULE ORAL ONCE
Refills: 0 | Status: COMPLETED | OUTPATIENT
Start: 2024-10-04 | End: 2024-10-04

## 2024-10-04 RX ORDER — INFLUENZA VIRUS VACCINE 15; 15; 15; 15 UG/.5ML; UG/.5ML; UG/.5ML; UG/.5ML
0.5 SUSPENSION INTRAMUSCULAR ONCE
Refills: 0 | Status: DISCONTINUED | OUTPATIENT
Start: 2024-10-04 | End: 2024-10-07

## 2024-10-04 RX ORDER — SODIUM CHLORIDE IRRIG SOLUTION 0.9 %
1000 SOLUTION, IRRIGATION IRRIGATION
Refills: 0 | Status: DISCONTINUED | OUTPATIENT
Start: 2024-10-04 | End: 2024-10-04

## 2024-10-04 RX ORDER — CHLORHEXIDINE GLUCONATE ORAL RINSE 1.2 MG/ML
1 SOLUTION DENTAL ONCE
Refills: 0 | Status: COMPLETED | OUTPATIENT
Start: 2024-10-04 | End: 2024-10-04

## 2024-10-04 RX ORDER — SODIUM CHLORIDE IRRIG SOLUTION 0.9 %
1000 SOLUTION, IRRIGATION IRRIGATION
Refills: 0 | Status: DISCONTINUED | OUTPATIENT
Start: 2024-10-04 | End: 2024-10-05

## 2024-10-04 RX ORDER — GABAPENTIN 800 MG/1
600 TABLET, FILM COATED ORAL ONCE
Refills: 0 | Status: COMPLETED | OUTPATIENT
Start: 2024-10-04 | End: 2024-10-04

## 2024-10-04 RX ORDER — FENTANYL CITRATE-0.9 % NACL/PF 300MCG/30
50 PATIENT CONTROLLED ANALGESIA VIAL INJECTION
Refills: 0 | Status: DISCONTINUED | OUTPATIENT
Start: 2024-10-04 | End: 2024-10-04

## 2024-10-04 RX ORDER — ACETAMINOPHEN 325 MG
1000 TABLET ORAL EVERY 6 HOURS
Refills: 0 | Status: COMPLETED | OUTPATIENT
Start: 2024-10-04 | End: 2024-10-05

## 2024-10-04 RX ORDER — OXYCODONE HYDROCHLORIDE 30 MG/1
5 TABLET, FILM COATED, EXTENDED RELEASE ORAL ONCE
Refills: 0 | Status: DISCONTINUED | OUTPATIENT
Start: 2024-10-04 | End: 2024-10-04

## 2024-10-04 RX ORDER — OXYCODONE HYDROCHLORIDE 30 MG/1
5 TABLET, FILM COATED, EXTENDED RELEASE ORAL EVERY 4 HOURS
Refills: 0 | Status: DISCONTINUED | OUTPATIENT
Start: 2024-10-04 | End: 2024-10-07

## 2024-10-04 RX ADMIN — CHLORHEXIDINE GLUCONATE ORAL RINSE 1 APPLICATION(S): 1.2 SOLUTION DENTAL at 11:45

## 2024-10-04 RX ADMIN — Medication 5000 UNIT(S): at 22:00

## 2024-10-04 RX ADMIN — Medication 30 MILLILITER(S): at 11:45

## 2024-10-04 RX ADMIN — CELECOXIB 400 MILLIGRAM(S): 200 CAPSULE ORAL at 11:44

## 2024-10-04 RX ADMIN — OXYCODONE HYDROCHLORIDE 5 MILLIGRAM(S): 30 TABLET, FILM COATED, EXTENDED RELEASE ORAL at 19:00

## 2024-10-04 RX ADMIN — GABAPENTIN 600 MILLIGRAM(S): 800 TABLET, FILM COATED ORAL at 11:44

## 2024-10-04 RX ADMIN — Medication 400 MILLIGRAM(S): at 19:55

## 2024-10-04 RX ADMIN — OXYCODONE HYDROCHLORIDE 5 MILLIGRAM(S): 30 TABLET, FILM COATED, EXTENDED RELEASE ORAL at 18:15

## 2024-10-04 NOTE — PATIENT PROFILE ADULT - FALL HARM RISK - HARM RISK INTERVENTIONS

## 2024-10-04 NOTE — CHART NOTE - NSCHARTNOTEFT_GEN_A_CORE
SURGERY POST OP CHECK    STATUS POST PROCEDURE: Laparoscopic sigmoidectomy     SUBJECTIVE: Pt seen and examined without complaints. Pain is controlled. Denies CP/SOB/N/V.   Ambulating well  Appears very well patient states feeling well overall. Minimal abdominal muscle discomfort.       OBJECTIVE:  Vital Signs Last 24 Hrs  T(C): 36.8 (04 Oct 2024 21:00), Max: 36.8 (04 Oct 2024 21:00)  T(F): 98.2 (04 Oct 2024 21:00), Max: 98.2 (04 Oct 2024 21:00)  HR: 106 (04 Oct 2024 21:00) (83 - 107)  BP: 121/73 (04 Oct 2024 21:00) (116/74 - 128/69)  BP(mean): 87 (04 Oct 2024 20:00) (76 - 92)  RR: 17 (04 Oct 2024 21:00) (13 - 24)  SpO2: 95% (04 Oct 2024 21:00) (95% - 100%)    Parameters below as of 04 Oct 2024 21:00  Patient On (Oxygen Delivery Method): room air      I&O's Summary    04 Oct 2024 07:01  -  04 Oct 2024 22:43  --------------------------------------------------------  IN: 200 mL / OUT: 725 mL / NET: -525 mL        PHYSICAL EXAM:  Gen: NAD, A&Ox3  Pulm: No respiratory distress, no subcostal retractions  CV: RRR, no JVD  Abd: Soft, NT, ND, incisions and port sites c/d/i, no rebound or guarding  Extremities: FROM, warm and well perfused, equal bilateral muscle strength    ASSESSMENT/PLAN: HPI:  18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 Patient with h/o hospital admission for presumed perforated diverticulitis with pelvic abscess. Pt with recent bouts of perforated diverticulitis. Attempt was made at colonoscopy but unable to perform given active inflammation and tortuous colon.   . Pt is s/p ; POD #0Laparoscopic sigmoidectomy   Appears very well. HDS. Pain controlled.         - Monitor bowel function   - Analgesia and antiemetics as needed  - Strict I&O's  - OOB as tolerated  - Incentive spirometry  - DVT prophylaxis: SCD  - Monitor overnight SURGERY POST OP CHECK    STATUS POST PROCEDURE: Laparoscopic sigmoidectomy     SUBJECTIVE: Pt seen and examined without complaints. Pain is controlled. Denies CP/SOB/N/V.   Ambulating well  Appears very well patient states feeling well overall. Minimal abdominal muscle discomfort.       OBJECTIVE:  Vital Signs Last 24 Hrs  T(C): 36.8 (04 Oct 2024 21:00), Max: 36.8 (04 Oct 2024 21:00)  T(F): 98.2 (04 Oct 2024 21:00), Max: 98.2 (04 Oct 2024 21:00)  HR: 106 (04 Oct 2024 21:00) (83 - 107)  BP: 121/73 (04 Oct 2024 21:00) (116/74 - 128/69)  BP(mean): 87 (04 Oct 2024 20:00) (76 - 92)  RR: 17 (04 Oct 2024 21:00) (13 - 24)  SpO2: 95% (04 Oct 2024 21:00) (95% - 100%)    Parameters below as of 04 Oct 2024 21:00  Patient On (Oxygen Delivery Method): room air      I&O's Summary    04 Oct 2024 07:01  -  04 Oct 2024 22:43  --------------------------------------------------------  IN: 200 mL / OUT: 725 mL / NET: -525 mL        PHYSICAL EXAM:  Gen: NAD, A&Ox3  Pulm: No respiratory distress, no subcostal retractions  CV: RRR, no JVD  Abd: Soft, NT, ND, incisions and port sites c/d/i, no rebound or guarding  Extremities: FROM, warm and well perfused, equal bilateral muscle strength  : blunt in place    ASSESSMENT/PLAN: HPI:  20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 Patient with h/o hospital admission for presumed perforated diverticulitis with pelvic abscess. Pt with recent bouts of perforated diverticulitis. Attempt was made at colonoscopy but unable to perform given active inflammation and tortuous colon.   . Pt is s/p ; POD #0Laparoscopic sigmoidectomy   Appears very well. HDS. Pain controlled.         - Monitor bowel function   - Analgesia and antiemetics as needed  - Strict I&O's  - OOB as tolerated  - Incentive spirometry  - DVT prophylaxis: SCD  - Monitor overnight  - monitor UOP via blunt

## 2024-10-05 LAB
ANION GAP SERPL CALC-SCNC: 13 MMOL/L — SIGNIFICANT CHANGE UP (ref 7–14)
BUN SERPL-MCNC: 9 MG/DL — SIGNIFICANT CHANGE UP (ref 7–23)
CALCIUM SERPL-MCNC: 9.1 MG/DL — SIGNIFICANT CHANGE UP (ref 8.4–10.5)
CHLORIDE SERPL-SCNC: 100 MMOL/L — SIGNIFICANT CHANGE UP (ref 98–107)
CO2 SERPL-SCNC: 23 MMOL/L — SIGNIFICANT CHANGE UP (ref 22–31)
CREAT SERPL-MCNC: 0.78 MG/DL — SIGNIFICANT CHANGE UP (ref 0.5–1.3)
EGFR: 130 ML/MIN/1.73M2 — SIGNIFICANT CHANGE UP
GLUCOSE SERPL-MCNC: 109 MG/DL — HIGH (ref 70–99)
HCT VFR BLD CALC: 41.9 % — SIGNIFICANT CHANGE UP (ref 39–50)
HGB BLD-MCNC: 13.8 G/DL — SIGNIFICANT CHANGE UP (ref 13–17)
MAGNESIUM SERPL-MCNC: 1.9 MG/DL — SIGNIFICANT CHANGE UP (ref 1.6–2.6)
MCHC RBC-ENTMCNC: 27.4 PG — SIGNIFICANT CHANGE UP (ref 27–34)
MCHC RBC-ENTMCNC: 32.9 GM/DL — SIGNIFICANT CHANGE UP (ref 32–36)
MCV RBC AUTO: 83.1 FL — SIGNIFICANT CHANGE UP (ref 80–100)
NRBC # BLD: 0 /100 WBCS — SIGNIFICANT CHANGE UP (ref 0–0)
NRBC # FLD: 0 K/UL — SIGNIFICANT CHANGE UP (ref 0–0)
PHOSPHATE SERPL-MCNC: 5.7 MG/DL — HIGH (ref 2.5–4.5)
PLATELET # BLD AUTO: 246 K/UL — SIGNIFICANT CHANGE UP (ref 150–400)
POTASSIUM SERPL-MCNC: 4.6 MMOL/L — SIGNIFICANT CHANGE UP (ref 3.5–5.3)
POTASSIUM SERPL-SCNC: 4.6 MMOL/L — SIGNIFICANT CHANGE UP (ref 3.5–5.3)
RBC # BLD: 5.04 M/UL — SIGNIFICANT CHANGE UP (ref 4.2–5.8)
RBC # FLD: 13.1 % — SIGNIFICANT CHANGE UP (ref 10.3–14.5)
SODIUM SERPL-SCNC: 136 MMOL/L — SIGNIFICANT CHANGE UP (ref 135–145)
WBC # BLD: 17.64 K/UL — HIGH (ref 3.8–10.5)
WBC # FLD AUTO: 17.64 K/UL — HIGH (ref 3.8–10.5)

## 2024-10-05 RX ORDER — PIPERACILLIN SODIUM AND TAZOBACTAM SODIUM 12; 1.5 G/60ML; G/60ML
3.38 INJECTION, POWDER, LYOPHILIZED, FOR SOLUTION INTRAVENOUS ONCE
Refills: 0 | Status: COMPLETED | OUTPATIENT
Start: 2024-10-05 | End: 2024-10-05

## 2024-10-05 RX ORDER — ACETAMINOPHEN 325 MG
975 TABLET ORAL EVERY 6 HOURS
Refills: 0 | Status: DISCONTINUED | OUTPATIENT
Start: 2024-10-05 | End: 2024-10-07

## 2024-10-05 RX ORDER — PSYLLIUM HUSK 0.4 G
1000 CAPSULE ORAL EVERY 12 HOURS
Refills: 0 | Status: DISCONTINUED | OUTPATIENT
Start: 2024-10-05 | End: 2024-10-07

## 2024-10-05 RX ORDER — PIPERACILLIN SODIUM AND TAZOBACTAM SODIUM 12; 1.5 G/60ML; G/60ML
3.38 INJECTION, POWDER, LYOPHILIZED, FOR SOLUTION INTRAVENOUS EVERY 8 HOURS
Refills: 0 | Status: DISCONTINUED | OUTPATIENT
Start: 2024-10-05 | End: 2024-10-07

## 2024-10-05 RX ORDER — KETOROLAC TROMETHAMINE 10 MG/1
15 TABLET, FILM COATED ORAL EVERY 6 HOURS
Refills: 0 | Status: DISCONTINUED | OUTPATIENT
Start: 2024-10-05 | End: 2024-10-07

## 2024-10-05 RX ADMIN — Medication 400 MILLIGRAM(S): at 06:14

## 2024-10-05 RX ADMIN — PIPERACILLIN SODIUM AND TAZOBACTAM SODIUM 25 GRAM(S): 12; 1.5 INJECTION, POWDER, LYOPHILIZED, FOR SOLUTION INTRAVENOUS at 21:31

## 2024-10-05 RX ADMIN — Medication 400 MILLIGRAM(S): at 14:27

## 2024-10-05 RX ADMIN — Medication 5000 UNIT(S): at 14:16

## 2024-10-05 RX ADMIN — Medication 5000 UNIT(S): at 06:14

## 2024-10-05 RX ADMIN — Medication 40 MILLILITER(S): at 06:09

## 2024-10-05 RX ADMIN — Medication 975 MILLIGRAM(S): at 22:31

## 2024-10-05 RX ADMIN — KETOROLAC TROMETHAMINE 15 MILLIGRAM(S): 10 TABLET, FILM COATED ORAL at 17:38

## 2024-10-05 RX ADMIN — Medication 400 MILLIGRAM(S): at 01:16

## 2024-10-05 RX ADMIN — PIPERACILLIN SODIUM AND TAZOBACTAM SODIUM 200 GRAM(S): 12; 1.5 INJECTION, POWDER, LYOPHILIZED, FOR SOLUTION INTRAVENOUS at 11:06

## 2024-10-05 RX ADMIN — PIPERACILLIN SODIUM AND TAZOBACTAM SODIUM 25 GRAM(S): 12; 1.5 INJECTION, POWDER, LYOPHILIZED, FOR SOLUTION INTRAVENOUS at 14:26

## 2024-10-05 RX ADMIN — Medication 5000 UNIT(S): at 21:31

## 2024-10-05 RX ADMIN — Medication 1000 MILLIGRAM(S): at 17:38

## 2024-10-05 RX ADMIN — Medication 975 MILLIGRAM(S): at 21:31

## 2024-10-05 NOTE — PROGRESS NOTE ADULT - ASSESSMENT
s/p lap LAR  LRD  OOB  pain control P 20 y/o Male with no significant PMH presents to presurgical testing with diagnosis of diverticulitis. Patient with h/o hospital admission for presumed perforated diverticulitis with pelvic abscess. Pt with recent bouts of perforated diverticulitis. Attempt was made at colonoscopy but unable to perform given active inflammation and tortuous colon.   . Pt is s/p ; POD #1 Laparoscopic LAR    - ERP protocol - toradol ok to give   -Monitor bowel function  - LRD  - Remove Cardneas - f/u TOV    - Analgesia and antiemetics as needed  - Strict I&O's  - OOB as tolerated  - Incentive spirometry  - DVT prophylaxis: SCD  - Monitor overnight    A team     [id number]

## 2024-10-06 LAB
ANION GAP SERPL CALC-SCNC: 13 MMOL/L — SIGNIFICANT CHANGE UP (ref 7–14)
BUN SERPL-MCNC: 12 MG/DL — SIGNIFICANT CHANGE UP (ref 7–23)
CALCIUM SERPL-MCNC: 9.2 MG/DL — SIGNIFICANT CHANGE UP (ref 8.4–10.5)
CHLORIDE SERPL-SCNC: 104 MMOL/L — SIGNIFICANT CHANGE UP (ref 98–107)
CO2 SERPL-SCNC: 24 MMOL/L — SIGNIFICANT CHANGE UP (ref 22–31)
CREAT SERPL-MCNC: 0.71 MG/DL — SIGNIFICANT CHANGE UP (ref 0.5–1.3)
EGFR: 134 ML/MIN/1.73M2 — SIGNIFICANT CHANGE UP
GLUCOSE SERPL-MCNC: 84 MG/DL — SIGNIFICANT CHANGE UP (ref 70–99)
HCT VFR BLD CALC: 43.6 % — SIGNIFICANT CHANGE UP (ref 39–50)
HGB BLD-MCNC: 14.4 G/DL — SIGNIFICANT CHANGE UP (ref 13–17)
MAGNESIUM SERPL-MCNC: 2.2 MG/DL — SIGNIFICANT CHANGE UP (ref 1.6–2.6)
MCHC RBC-ENTMCNC: 27.3 PG — SIGNIFICANT CHANGE UP (ref 27–34)
MCHC RBC-ENTMCNC: 33 GM/DL — SIGNIFICANT CHANGE UP (ref 32–36)
MCV RBC AUTO: 82.7 FL — SIGNIFICANT CHANGE UP (ref 80–100)
NRBC # BLD: 0 /100 WBCS — SIGNIFICANT CHANGE UP (ref 0–0)
NRBC # FLD: 0 K/UL — SIGNIFICANT CHANGE UP (ref 0–0)
PHOSPHATE SERPL-MCNC: 3.6 MG/DL — SIGNIFICANT CHANGE UP (ref 2.5–4.5)
PLATELET # BLD AUTO: 234 K/UL — SIGNIFICANT CHANGE UP (ref 150–400)
POTASSIUM SERPL-MCNC: 4 MMOL/L — SIGNIFICANT CHANGE UP (ref 3.5–5.3)
POTASSIUM SERPL-SCNC: 4 MMOL/L — SIGNIFICANT CHANGE UP (ref 3.5–5.3)
RBC # BLD: 5.27 M/UL — SIGNIFICANT CHANGE UP (ref 4.2–5.8)
RBC # FLD: 13.6 % — SIGNIFICANT CHANGE UP (ref 10.3–14.5)
SODIUM SERPL-SCNC: 141 MMOL/L — SIGNIFICANT CHANGE UP (ref 135–145)
WBC # BLD: 13.24 K/UL — HIGH (ref 3.8–10.5)
WBC # FLD AUTO: 13.24 K/UL — HIGH (ref 3.8–10.5)

## 2024-10-06 RX ORDER — SODIUM CHLORIDE IRRIG SOLUTION 0.9 %
1000 SOLUTION, IRRIGATION IRRIGATION
Refills: 0 | Status: DISCONTINUED | OUTPATIENT
Start: 2024-10-06 | End: 2024-10-07

## 2024-10-06 RX ORDER — ONDANSETRON HCL/PF 4 MG/2 ML
4 VIAL (ML) INJECTION ONCE
Refills: 0 | Status: COMPLETED | OUTPATIENT
Start: 2024-10-06 | End: 2024-10-06

## 2024-10-06 RX ADMIN — Medication 975 MILLIGRAM(S): at 02:31

## 2024-10-06 RX ADMIN — Medication 5000 UNIT(S): at 06:24

## 2024-10-06 RX ADMIN — PIPERACILLIN SODIUM AND TAZOBACTAM SODIUM 25 GRAM(S): 12; 1.5 INJECTION, POWDER, LYOPHILIZED, FOR SOLUTION INTRAVENOUS at 21:47

## 2024-10-06 RX ADMIN — KETOROLAC TROMETHAMINE 15 MILLIGRAM(S): 10 TABLET, FILM COATED ORAL at 18:45

## 2024-10-06 RX ADMIN — Medication 975 MILLIGRAM(S): at 07:14

## 2024-10-06 RX ADMIN — Medication 40 MILLILITER(S): at 12:31

## 2024-10-06 RX ADMIN — KETOROLAC TROMETHAMINE 15 MILLIGRAM(S): 10 TABLET, FILM COATED ORAL at 12:45

## 2024-10-06 RX ADMIN — Medication 5000 UNIT(S): at 21:48

## 2024-10-06 RX ADMIN — Medication 1000 MILLIGRAM(S): at 06:14

## 2024-10-06 RX ADMIN — KETOROLAC TROMETHAMINE 15 MILLIGRAM(S): 10 TABLET, FILM COATED ORAL at 02:31

## 2024-10-06 RX ADMIN — KETOROLAC TROMETHAMINE 15 MILLIGRAM(S): 10 TABLET, FILM COATED ORAL at 12:32

## 2024-10-06 RX ADMIN — Medication 1000 MILLIGRAM(S): at 18:34

## 2024-10-06 RX ADMIN — Medication 4 MILLIGRAM(S): at 10:40

## 2024-10-06 RX ADMIN — KETOROLAC TROMETHAMINE 15 MILLIGRAM(S): 10 TABLET, FILM COATED ORAL at 01:31

## 2024-10-06 RX ADMIN — PIPERACILLIN SODIUM AND TAZOBACTAM SODIUM 25 GRAM(S): 12; 1.5 INJECTION, POWDER, LYOPHILIZED, FOR SOLUTION INTRAVENOUS at 16:45

## 2024-10-06 RX ADMIN — Medication 975 MILLIGRAM(S): at 06:14

## 2024-10-06 RX ADMIN — Medication 975 MILLIGRAM(S): at 01:31

## 2024-10-06 RX ADMIN — KETOROLAC TROMETHAMINE 15 MILLIGRAM(S): 10 TABLET, FILM COATED ORAL at 18:34

## 2024-10-06 RX ADMIN — PIPERACILLIN SODIUM AND TAZOBACTAM SODIUM 25 GRAM(S): 12; 1.5 INJECTION, POWDER, LYOPHILIZED, FOR SOLUTION INTRAVENOUS at 06:14

## 2024-10-06 RX ADMIN — Medication 5000 UNIT(S): at 16:45

## 2024-10-06 NOTE — PROGRESS NOTE ADULT - ASSESSMENT
P 20 y/o Male with no significant PMH presents to presurgical testing with diagnosis of diverticulitis. Patient with h/o hospital admission for presumed perforated diverticulitis with pelvic abscess. Pt with recent bouts of perforated diverticulitis. Attempt was made at colonoscopy but unable to perform given active inflammation and tortuous colon.   . Pt is s/p ; POD #2 Laparoscopic LAR    - ERP protocol - toradol ok to give   - Monitor bowel function  - LRD - tolerating   - passed TOV    - Analgesia and antiemetics as needed  - Strict I&O's  - OOB as tolerated  - Incentive spirometry  - DVT prophylaxis: SCD  - Dispo: poss DC tomorrow     A team   [id number]

## 2024-10-07 ENCOUNTER — TRANSCRIPTION ENCOUNTER (OUTPATIENT)
Age: 19
End: 2024-10-07

## 2024-10-07 VITALS
RESPIRATION RATE: 18 BRPM | SYSTOLIC BLOOD PRESSURE: 126 MMHG | HEART RATE: 91 BPM | OXYGEN SATURATION: 97 % | DIASTOLIC BLOOD PRESSURE: 81 MMHG | TEMPERATURE: 98 F

## 2024-10-07 LAB
ANION GAP SERPL CALC-SCNC: 10 MMOL/L — SIGNIFICANT CHANGE UP (ref 7–14)
BUN SERPL-MCNC: 9 MG/DL — SIGNIFICANT CHANGE UP (ref 7–23)
CALCIUM SERPL-MCNC: 9.2 MG/DL — SIGNIFICANT CHANGE UP (ref 8.4–10.5)
CHLORIDE SERPL-SCNC: 103 MMOL/L — SIGNIFICANT CHANGE UP (ref 98–107)
CO2 SERPL-SCNC: 27 MMOL/L — SIGNIFICANT CHANGE UP (ref 22–31)
CREAT SERPL-MCNC: 0.83 MG/DL — SIGNIFICANT CHANGE UP (ref 0.5–1.3)
EGFR: 128 ML/MIN/1.73M2 — SIGNIFICANT CHANGE UP
GLUCOSE BLDC GLUCOMTR-MCNC: 111 MG/DL — HIGH (ref 70–99)
GLUCOSE SERPL-MCNC: 89 MG/DL — SIGNIFICANT CHANGE UP (ref 70–99)
HCT VFR BLD CALC: 42 % — SIGNIFICANT CHANGE UP (ref 39–50)
HGB BLD-MCNC: 14.3 G/DL — SIGNIFICANT CHANGE UP (ref 13–17)
MAGNESIUM SERPL-MCNC: 2.1 MG/DL — SIGNIFICANT CHANGE UP (ref 1.6–2.6)
MCHC RBC-ENTMCNC: 28.3 PG — SIGNIFICANT CHANGE UP (ref 27–34)
MCHC RBC-ENTMCNC: 34 GM/DL — SIGNIFICANT CHANGE UP (ref 32–36)
MCV RBC AUTO: 83 FL — SIGNIFICANT CHANGE UP (ref 80–100)
NRBC # BLD: 0 /100 WBCS — SIGNIFICANT CHANGE UP (ref 0–0)
NRBC # FLD: 0 K/UL — SIGNIFICANT CHANGE UP (ref 0–0)
PHOSPHATE SERPL-MCNC: 3.8 MG/DL — SIGNIFICANT CHANGE UP (ref 2.5–4.5)
PLATELET # BLD AUTO: 268 K/UL — SIGNIFICANT CHANGE UP (ref 150–400)
POTASSIUM SERPL-MCNC: 3.7 MMOL/L — SIGNIFICANT CHANGE UP (ref 3.5–5.3)
POTASSIUM SERPL-SCNC: 3.7 MMOL/L — SIGNIFICANT CHANGE UP (ref 3.5–5.3)
RBC # BLD: 5.06 M/UL — SIGNIFICANT CHANGE UP (ref 4.2–5.8)
RBC # FLD: 14 % — SIGNIFICANT CHANGE UP (ref 10.3–14.5)
SODIUM SERPL-SCNC: 140 MMOL/L — SIGNIFICANT CHANGE UP (ref 135–145)
WBC # BLD: 10.73 K/UL — HIGH (ref 3.8–10.5)
WBC # FLD AUTO: 10.73 K/UL — HIGH (ref 3.8–10.5)

## 2024-10-07 RX ORDER — ACETAMINOPHEN 325 MG
3 TABLET ORAL
Qty: 0 | Refills: 0 | DISCHARGE
Start: 2024-10-07

## 2024-10-07 RX ORDER — OXYCODONE HYDROCHLORIDE 30 MG/1
1 TABLET, FILM COATED, EXTENDED RELEASE ORAL
Qty: 5 | Refills: 0
Start: 2024-10-07

## 2024-10-07 RX ADMIN — KETOROLAC TROMETHAMINE 15 MILLIGRAM(S): 10 TABLET, FILM COATED ORAL at 05:37

## 2024-10-07 RX ADMIN — Medication 5000 UNIT(S): at 05:36

## 2024-10-07 RX ADMIN — KETOROLAC TROMETHAMINE 15 MILLIGRAM(S): 10 TABLET, FILM COATED ORAL at 05:54

## 2024-10-07 RX ADMIN — KETOROLAC TROMETHAMINE 15 MILLIGRAM(S): 10 TABLET, FILM COATED ORAL at 00:07

## 2024-10-07 RX ADMIN — KETOROLAC TROMETHAMINE 15 MILLIGRAM(S): 10 TABLET, FILM COATED ORAL at 13:03

## 2024-10-07 RX ADMIN — Medication 20 MILLIEQUIVALENT(S): at 13:11

## 2024-10-07 RX ADMIN — Medication 40 MILLILITER(S): at 13:02

## 2024-10-07 RX ADMIN — Medication 100 MILLIEQUIVALENT(S): at 13:11

## 2024-10-07 RX ADMIN — Medication 1000 MILLIGRAM(S): at 05:37

## 2024-10-07 RX ADMIN — PIPERACILLIN SODIUM AND TAZOBACTAM SODIUM 25 GRAM(S): 12; 1.5 INJECTION, POWDER, LYOPHILIZED, FOR SOLUTION INTRAVENOUS at 13:46

## 2024-10-07 RX ADMIN — PIPERACILLIN SODIUM AND TAZOBACTAM SODIUM 25 GRAM(S): 12; 1.5 INJECTION, POWDER, LYOPHILIZED, FOR SOLUTION INTRAVENOUS at 05:42

## 2024-10-07 RX ADMIN — Medication 5000 UNIT(S): at 13:03

## 2024-10-07 NOTE — DISCHARGE NOTE NURSING/CASE MANAGEMENT/SOCIAL WORK - NSDCPNINST_GEN_ALL_CORE
Notify MD if experiencing fever, nausea, vomiting, increased pain, bleeding, drainage or swelling from incision.

## 2024-10-07 NOTE — DISCHARGE NOTE PROVIDER - CARE PROVIDER_API CALL
Morales Bryson  Surgery  [address] [place] [address], [address]  [place] [address]  Phone: [phone]  Fax: [phone]  Follow Up Time: 2 weeks

## 2024-10-07 NOTE — DISCHARGE NOTE PROVIDER - NSDCACTIVITY_GEN_ALL_CORE
Stairs allowed/Walking - Indoors allowed/Walking - Outdoors allowed/Follow Instructions Provided by your Surgical Team/Activity as tolerated

## 2024-10-07 NOTE — DISCHARGE NOTE PROVIDER - HOSPITAL COURSE
19y M with PMHx of diverticulitis, h/o bouts of perforated diverticulitis, presented on 10/4/24 for elective laparoscopic LAR. Patient tolerated procedure well, without complications. Transferred from PACU to surgical floors. 10/5, Cardenas was discontinued, passed TOV. Patient was upgraded to low fiber diet, once episode of nausea and emesis on 10/6, diet backed down to CLD. Diet re-upgraded to LRD on 10/7, patient tolerated multiple meals without repeat nausea/vomiting.    At the time of discharge, the patient was hemodynamically stable, was tolerating PO diet, was voiding urine and passing stool.  Pt was ambulating and was comfortable with adequate pain control.  The patient was instructed to follow up with Dr. Bryson within 1-2 weeks after discharge from the hospital.  The patient felt comfortable with discharge.  The patient had no other issues.    Per attending, patient deemed medically stable and ready for discharge at this time.

## 2024-10-07 NOTE — DISCHARGE NOTE PROVIDER - ATTENDING ATTESTATION STATEMENT
I have personally seen and examined the patient. I have collaborated with and supervised the
Alert and oriented to person, place and time

## 2024-10-07 NOTE — PROGRESS NOTE ADULT - ASSESSMENT
P 20 y/o Male with no significant PMH presents to presurgical testing with diagnosis of diverticulitis. Patient with h/o hospital admission for presumed perforated diverticulitis with pelvic abscess. Pt with recent bouts of perforated diverticulitis. Attempt was made at colonoscopy but unable to perform given active inflammation and tortuous colon.   . Pt is s/p ; POD #3 Laparoscopic LAR    - ERP protocol - toradol ok to give   - Monitor bowel function  - CLD - adv to LRD today   - passed TOV    - Analgesia and antiemetics as needed  - Strict I&O's  - OOB as tolerated  - Incentive spirometry  - DVT prophylaxis: SCD  - Dispo: poss DC tomorrow     A team   [id number]

## 2024-10-07 NOTE — PROGRESS NOTE ADULT - SUBJECTIVE AND OBJECTIVE BOX
INTERVAL EVENTS: 1 episode of emesis yesterday, backed his diet to CLD, tolerating, N/V resolved.   SUBJECTIVE: Patient seen and examined at bedside with surgical team, patient without complaints. Denies fever, chills, CP, SOB nausea, vomiting, abdominal pain.    OBJECTIVE:    Vital Signs Last 24 Hrs  T(C): 36.6 (07 Oct 2024 06:32), Max: 37.1 (06 Oct 2024 09:11)  T(F): 97.9 (07 Oct 2024 06:32), Max: 98.8 (06 Oct 2024 09:11)  HR: 89 (07 Oct 2024 06:32) (85 - 99)  BP: 123/78 (07 Oct 2024 06:32) (123/78 - 140/81)  BP(mean): --  RR: 18 (07 Oct 2024 06:32) (16 - 18)  SpO2: 95% (07 Oct 2024 06:32) (95% - 99%)    Parameters below as of 07 Oct 2024 06:32  Patient On (Oxygen Delivery Method): room air    I&O's Detail    06 Oct 2024 07:01  -  07 Oct 2024 07:00  --------------------------------------------------------  IN:    Oral Fluid: 120 mL  Total IN: 120 mL    OUT:    Emesis (mL): 200 mL    Voided (mL): 600 mL  Total OUT: 800 mL    Total NET: -680 mL      MEDICATIONS  (STANDING):  acetaminophen     Tablet .. 975 milliGRAM(s) Oral every 6 hours  heparin   Injectable 5000 Unit(s) SubCutaneous every 8 hours  influenza   Vaccine 0.5 milliLiter(s) IntraMuscular once  ketorolac   Injectable 15 milliGRAM(s) IV Push every 6 hours  lactated ringers. 1000 milliLiter(s) (40 mL/Hr) IV Continuous <Continuous>  magnesium oxide 1000 milliGRAM(s) Oral every 12 hours  piperacillin/tazobactam IVPB.. 3.375 Gram(s) IV Intermittent every 8 hours  potassium chloride  10 mEq/100 mL IVPB 10 milliEquivalent(s) IV Intermittent once    MEDICATIONS  (PRN):  oxyCODONE    IR 5 milliGRAM(s) Oral every 4 hours PRN Severe Pain (7 - 10)  oxyCODONE    IR 2.5 milliGRAM(s) Oral every 4 hours PRN Moderate Pain (4 - 6)      PHYSICAL EXAM:  Constitutional: A&Ox3, NAD  Respiratory: Unlabored breathing  Abdomen: soft, nontender, nondistended. No obvious masses. No peritonitis, incisions c/d/i  Extremities: WWP, MONTES spontaneously    LABS:                        14.3   10.73 )-----------( 268      ( 07 Oct 2024 04:30 )             42.0     10-07    140  |  103  |  9   ----------------------------<  89  3.7   |  27  |  0.83    Ca    9.2      07 Oct 2024 04:30  Phos  3.8     10-07  Mg     2.10     10-07          Urinalysis Basic - ( 07 Oct 2024 04:30 )    Color: x / Appearance: x / SG: x / pH: x  Gluc: 89 mg/dL / Ketone: x  / Bili: x / Urobili: x   Blood: x / Protein: x / Nitrite: x   Leuk Esterase: x / RBC: x / WBC x   Sq Epi: x / Non Sq Epi: x / Bacteria: x          
Date of service: 10-05-24 @ 10:38    INTERVAL HPI/OVERNIGHT EVENTS:  Patient is a 19yMale  no acute events, no flatus yet, manoj clears    Vital Signs Last 24 Hrs  T(C): 36.5 (05 Oct 2024 08:15), Max: 36.8 (04 Oct 2024 21:00)  T(F): 97.7 (05 Oct 2024 08:15), Max: 98.3 (05 Oct 2024 05:55)  HR: 83 (05 Oct 2024 08:15) (64 - 107)  BP: 118/69 (05 Oct 2024 08:15) (115/66 - 128/69)  BP(mean): 87 (04 Oct 2024 20:00) (76 - 92)  RR: 18 (05 Oct 2024 08:15) (13 - 24)  SpO2: 100% (05 Oct 2024 08:15) (95% - 100%)    Parameters below as of 05 Oct 2024 08:15  Patient On (Oxygen Delivery Method): room air      10-04 @ 07:01  -  10-05 @ 07:00  --------------------------------------------------------  IN: 560 mL / OUT: 1275 mL / NET: -715 mL    10-05 @ 07:01  -  10-05 @ 10:38  --------------------------------------------------------  IN: 160 mL / OUT: 175 mL / NET: -15 mL        PHYSICAL EXAM:  Constitutional: well developed, well nourished, NAD  Eyes: anicteric  ENMT: normal facies, symmetric  Neck: supple  Respiratory: CTA bilat  Cardiovascular: RRR  Gastrointestinal: abdomen soft, nontender, nondistended. No obvious masses. No peritonitis, incisions c/d/i  Extremities: FROM, warm  Neurological: intact, non-focal  Skin: no gross lesions  Lymph Nodes: no gross adenopathy  Musculoskeletal: equal strength bilateral upper and lower extremities  Psychiatric: oriented x 3; appropriate        LABS:                        13.8   17.64 )-----------( 246      ( 05 Oct 2024 05:05 )             41.9     10-05    136  |  100  |  9   ----------------------------<  109[H]  4.6   |  23  |  0.78    Ca    9.1      05 Oct 2024 05:05  Phos  5.7     10-05  Mg     1.90     10-05        Urinalysis Basic - ( 05 Oct 2024 05:05 )    Color: x / Appearance: x / SG: x / pH: x  Gluc: 109 mg/dL / Ketone: x  / Bili: x / Urobili: x   Blood: x / Protein: x / Nitrite: x   Leuk Esterase: x / RBC: x / WBC x   Sq Epi: x / Non Sq Epi: x / Bacteria: x      Magnesium: 1.90 mg/dL (10-05 @ 05:05)  Phosphorus: 5.7 mg/dL (10-05 @ 05:05)      
  INTERVAL EVENTS: No acute events overnight.  SUBJECTIVE: Patient seen and examined at bedside with surgical team, patient without complaints. Denies fever, chills, CP, SOB nausea, vomiting, abdominal pain.    OBJECTIVE:    Vital Signs Last 24 Hrs  T(C): 36.6 (06 Oct 2024 05:32), Max: 37.2 (06 Oct 2024 01:32)  T(F): 97.8 (06 Oct 2024 05:32), Max: 98.9 (06 Oct 2024 01:32)  HR: 92 (06 Oct 2024 05:32) (78 - 100)  BP: 118/72 (06 Oct 2024 05:32) (118/72 - 135/65)  BP(mean): --  RR: 16 (06 Oct 2024 05:32) (16 - 18)  SpO2: 96% (06 Oct 2024 05:32) (94% - 100%)    Parameters below as of 06 Oct 2024 05:32  Patient On (Oxygen Delivery Method): room air    I&O's Detail    05 Oct 2024 07:01  -  06 Oct 2024 07:00  --------------------------------------------------------  IN:    Lactated Ringers: 40 mL    Oral Fluid: 830 mL  Total IN: 870 mL    OUT:    Indwelling Catheter - Urethral (mL): 175 mL    Voided (mL): 250 mL  Total OUT: 425 mL    Total NET: 445 mL      MEDICATIONS  (STANDING):  acetaminophen     Tablet .. 975 milliGRAM(s) Oral every 6 hours  heparin   Injectable 5000 Unit(s) SubCutaneous every 8 hours  influenza   Vaccine 0.5 milliLiter(s) IntraMuscular once  ketorolac   Injectable 15 milliGRAM(s) IV Push every 6 hours  magnesium oxide 1000 milliGRAM(s) Oral every 12 hours  piperacillin/tazobactam IVPB.. 3.375 Gram(s) IV Intermittent every 8 hours    MEDICATIONS  (PRN):  oxyCODONE    IR 2.5 milliGRAM(s) Oral every 4 hours PRN Moderate Pain (4 - 6)  oxyCODONE    IR 5 milliGRAM(s) Oral every 4 hours PRN Severe Pain (7 - 10)      PHYSICAL EXAM:  Constitutional: A&Ox3, NAD  Respiratory: Unlabored breathing  Abdomen: soft, nontender, nondistended. No obvious masses. No peritonitis, incisions c/d/i  Extremities: WWP, MONTES spontaneously    LABS:                        14.4   13.24 )-----------( 234      ( 06 Oct 2024 06:34 )             43.6     10-06    141  |  104  |  12  ----------------------------<  84  4.0   |  24  |  0.71    Ca    9.2      06 Oct 2024 06:34  Phos  3.6     10-06  Mg     2.20     10-06          Urinalysis Basic - ( 06 Oct 2024 06:34 )    Color: x / Appearance: x / SG: x / pH: x  Gluc: 84 mg/dL / Ketone: x  / Bili: x / Urobili: x   Blood: x / Protein: x / Nitrite: x   Leuk Esterase: x / RBC: x / WBC x   Sq Epi: x / Non Sq Epi: x / Bacteria: x

## 2024-10-07 NOTE — DISCHARGE NOTE PROVIDER - NSDCCPTREATMENT_GEN_ALL_CORE_FT
PRINCIPAL PROCEDURE  Procedure: Laparoscopic sigmoidectomy  Findings and Treatment: WOUND CARE:  Please keep incisions clean and dry. Please do not Scrub or rub incisions. Do not use lotion or powder on incisions.   BATHING: You may shower and/or sponge bathe. You may use warm soapy water in the shower and rinse, pat dry.  ACTIVITY: No heavy lifting or straining. Otherwise, you may return to your usual level of physical activity. If you are taking narcotic pain medication DO NOT drive a car, operate machinery or make important decisions.  DIET: follow a low residue/ low fiber diet  NOTIFY YOUR SURGEON IF YOU HAVE: any bleeding that does not stop, any pus draining from your wound(s), any fever (over 100.4 F) persistent nausea/vomiting, or if your pain is not controlled on your discharge pain medications, unable to urinate.

## 2024-10-07 NOTE — DISCHARGE NOTE PROVIDER - NSDCFUADDAPPT_GEN_ALL_CORE_FT
Please call your primary care doctor to schedule an appointment within the next 1-2 weeks to discuss your recent hospitalization. Please bring copies of your discharge paperwork.

## 2024-10-08 ENCOUNTER — NON-APPOINTMENT (OUTPATIENT)
Age: 19
End: 2024-10-08

## 2024-10-09 ENCOUNTER — NON-APPOINTMENT (OUTPATIENT)
Age: 19
End: 2024-10-09

## 2024-10-11 LAB — SURGICAL PATHOLOGY STUDY: SIGNIFICANT CHANGE UP

## 2024-10-15 ENCOUNTER — APPOINTMENT (OUTPATIENT)
Dept: COLORECTAL SURGERY | Facility: CLINIC | Age: 19
End: 2024-10-15

## 2024-10-15 DIAGNOSIS — K57.32 DIVERTICULITIS OF LARGE INTESTINE W/OUT PERFORATION OR ABSCESS W/OUT BLEEDING: ICD-10-CM

## 2024-10-15 PROCEDURE — 99212 OFFICE O/P EST SF 10 MIN: CPT

## 2024-11-12 ENCOUNTER — APPOINTMENT (OUTPATIENT)
Dept: COLORECTAL SURGERY | Facility: CLINIC | Age: 19
End: 2024-11-12

## (undated) DEVICE — DRSG OPSITE 2.5 X 2"

## (undated) DEVICE — Device

## (undated) DEVICE — LIGASURE BLUNT TIP 37CM

## (undated) DEVICE — SUT MAXON 1 36" GS-24

## (undated) DEVICE — TUBING SUCTION NONCONDUCTIVE 6MM X 12FT

## (undated) DEVICE — STAPLER SKIN MULTI DIRECTION W35

## (undated) DEVICE — PACK ABDOMINAL CLOSURE

## (undated) DEVICE — ELCTR BOVIE BLADE 3/4" EXTENDED LENGTH 6"

## (undated) DEVICE — TROCAR COVIDIEN VERSAPORT BLADELESS OPTICAL 5MM

## (undated) DEVICE — BLADE SCALPEL SAFETYLOCK #10

## (undated) DEVICE — SUT VICRYL PLUS 2-0 18" TIES UNDYED

## (undated) DEVICE — SUT VICRYL 2-0 27" SH UNDYED

## (undated) DEVICE — LABELS BLANK W PEN

## (undated) DEVICE — CLN COAG SCRUBBIE TIP

## (undated) DEVICE — ELCTR BOVIE PENCIL SMOKE EVACUATION

## (undated) DEVICE — TUBING OLYMPUS INSUFFLATION

## (undated) DEVICE — PACK MAJOR ABDOMINAL W ENDO DRAPE

## (undated) DEVICE — TROCAR COVIDIEN BLUNT TIP HASSAN 10MM STANDARD

## (undated) DEVICE — ELCTR GROUNDING PAD ADULT COVIDIEN

## (undated) DEVICE — ANESTHESIA CIRCUIT ADULT HMEF

## (undated) DEVICE — POOLE SUCTION TIP

## (undated) DEVICE — TUBING INSUFFLATION LAP FILTER 10FT

## (undated) DEVICE — SUT VICRYL 3-0 18" SH UNDYED (POP-OFF)

## (undated) DEVICE — TROCAR COVIDIEN BLUNT TIP HASSAN 10MM

## (undated) DEVICE — WARMING BLANKET FULL ADULT

## (undated) DEVICE — VENODYNE/SCD SLEEVE CALF MEDIUM

## (undated) DEVICE — GELPORT LAPAROSCOPIC SYSTEM

## (undated) DEVICE — STAPLER COVIDIEN ENDO GIA STANDARD HANDLE

## (undated) DEVICE — GLV 8 PROTEXIS (CREAM) NEU-THERA

## (undated) DEVICE — POSITIONER PINK PAD PIGAZZI SYSTEM

## (undated) DEVICE — DRSG STERISTRIPS 0.5 X 4"

## (undated) DEVICE — POSITIONER FOAM EGG CRATE ULNAR 2PCS (PINK)

## (undated) DEVICE — SUCTION YANKAUER OPEN TIP NO VENT CURVE

## (undated) DEVICE — BLADE SCALPEL SAFETYLOCK #15

## (undated) DEVICE — BASIN SET SINGLE

## (undated) DEVICE — DRSG MEDIPORE + PAD 3.5X10"